# Patient Record
Sex: MALE | Race: WHITE | NOT HISPANIC OR LATINO
[De-identification: names, ages, dates, MRNs, and addresses within clinical notes are randomized per-mention and may not be internally consistent; named-entity substitution may affect disease eponyms.]

---
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
## 2022-03-29 ENCOUNTER — EMERGENCY (EMERGENCY)
Facility: HOSPITAL | Age: 22
LOS: 1 days | Discharge: HOME | End: 2022-03-29
Attending: EMERGENCY MEDICINE | Admitting: EMERGENCY MEDICINE
Payer: COMMERCIAL

## 2022-03-29 VITALS
DIASTOLIC BLOOD PRESSURE: 56 MMHG | SYSTOLIC BLOOD PRESSURE: 135 MMHG | OXYGEN SATURATION: 99 % | RESPIRATION RATE: 18 BRPM | HEART RATE: 92 BPM

## 2022-03-29 VITALS
HEIGHT: 65 IN | OXYGEN SATURATION: 95 % | RESPIRATION RATE: 24 BRPM | TEMPERATURE: 99 F | SYSTOLIC BLOOD PRESSURE: 142 MMHG | HEART RATE: 102 BPM | WEIGHT: 214.95 LBS | DIASTOLIC BLOOD PRESSURE: 70 MMHG

## 2022-03-29 PROBLEM — R56.9 UNSPECIFIED CONVULSIONS: Chronic | Status: ACTIVE | Noted: 2021-10-05

## 2022-03-29 PROBLEM — F84.0 AUTISTIC DISORDER: Chronic | Status: ACTIVE | Noted: 2021-10-05

## 2022-03-29 LAB
ALBUMIN SERPL ELPH-MCNC: 3.9 G/DL — SIGNIFICANT CHANGE UP (ref 3.3–5)
ALP SERPL-CCNC: 90 U/L — SIGNIFICANT CHANGE UP (ref 30–120)
ALT FLD-CCNC: 98 U/L DA — HIGH (ref 10–60)
ANION GAP SERPL CALC-SCNC: 4 MMOL/L — LOW (ref 5–17)
APTT BLD: 27.3 SEC — LOW (ref 27.5–35.5)
AST SERPL-CCNC: 61 U/L — HIGH (ref 10–40)
BASOPHILS # BLD AUTO: 0.01 K/UL — SIGNIFICANT CHANGE UP (ref 0–0.2)
BASOPHILS NFR BLD AUTO: 0.1 % — SIGNIFICANT CHANGE UP (ref 0–2)
BILIRUB SERPL-MCNC: 0.2 MG/DL — SIGNIFICANT CHANGE UP (ref 0.2–1.2)
BUN SERPL-MCNC: 16 MG/DL — SIGNIFICANT CHANGE UP (ref 7–23)
CALCIUM SERPL-MCNC: 9 MG/DL — SIGNIFICANT CHANGE UP (ref 8.4–10.5)
CHLORIDE SERPL-SCNC: 102 MMOL/L — SIGNIFICANT CHANGE UP (ref 96–108)
CO2 SERPL-SCNC: 30 MMOL/L — SIGNIFICANT CHANGE UP (ref 22–31)
CREAT SERPL-MCNC: 0.83 MG/DL — SIGNIFICANT CHANGE UP (ref 0.5–1.3)
EGFR: 129 ML/MIN/1.73M2 — SIGNIFICANT CHANGE UP
EOSINOPHIL # BLD AUTO: 0 K/UL — SIGNIFICANT CHANGE UP (ref 0–0.5)
EOSINOPHIL NFR BLD AUTO: 0 % — SIGNIFICANT CHANGE UP (ref 0–6)
GLUCOSE SERPL-MCNC: 95 MG/DL — SIGNIFICANT CHANGE UP (ref 70–99)
HCT VFR BLD CALC: 46.2 % — SIGNIFICANT CHANGE UP (ref 39–50)
HGB BLD-MCNC: 14.2 G/DL — SIGNIFICANT CHANGE UP (ref 13–17)
IMM GRANULOCYTES NFR BLD AUTO: 0.5 % — SIGNIFICANT CHANGE UP (ref 0–1.5)
INR BLD: 1.15 RATIO — SIGNIFICANT CHANGE UP (ref 0.88–1.16)
LYMPHOCYTES # BLD AUTO: 1.1 K/UL — SIGNIFICANT CHANGE UP (ref 1–3.3)
LYMPHOCYTES # BLD AUTO: 7.4 % — LOW (ref 13–44)
MCHC RBC-ENTMCNC: 25.2 PG — LOW (ref 27–34)
MCHC RBC-ENTMCNC: 30.7 GM/DL — LOW (ref 32–36)
MCV RBC AUTO: 81.9 FL — SIGNIFICANT CHANGE UP (ref 80–100)
MONOCYTES # BLD AUTO: 0.64 K/UL — SIGNIFICANT CHANGE UP (ref 0–0.9)
MONOCYTES NFR BLD AUTO: 4.3 % — SIGNIFICANT CHANGE UP (ref 2–14)
NEUTROPHILS # BLD AUTO: 13.12 K/UL — HIGH (ref 1.8–7.4)
NEUTROPHILS NFR BLD AUTO: 87.7 % — HIGH (ref 43–77)
NRBC # BLD: 0 /100 WBCS — SIGNIFICANT CHANGE UP (ref 0–0)
PLATELET # BLD AUTO: 337 K/UL — SIGNIFICANT CHANGE UP (ref 150–400)
POTASSIUM SERPL-MCNC: 5.5 MMOL/L — HIGH (ref 3.5–5.3)
POTASSIUM SERPL-SCNC: 5.5 MMOL/L — HIGH (ref 3.5–5.3)
PROT SERPL-MCNC: 8.4 G/DL — HIGH (ref 6–8.3)
PROTHROM AB SERPL-ACNC: 13.2 SEC — SIGNIFICANT CHANGE UP (ref 10.5–13.4)
RBC # BLD: 5.64 M/UL — SIGNIFICANT CHANGE UP (ref 4.2–5.8)
RBC # FLD: 14.9 % — HIGH (ref 10.3–14.5)
SARS-COV-2 RNA SPEC QL NAA+PROBE: SIGNIFICANT CHANGE UP
SODIUM SERPL-SCNC: 136 MMOL/L — SIGNIFICANT CHANGE UP (ref 135–145)
VALPROATE SERPL-MCNC: 53 UG/ML — SIGNIFICANT CHANGE UP (ref 50–100)
WBC # BLD: 14.95 K/UL — HIGH (ref 3.8–10.5)
WBC # FLD AUTO: 14.95 K/UL — HIGH (ref 3.8–10.5)

## 2022-03-29 PROCEDURE — 36415 COLL VENOUS BLD VENIPUNCTURE: CPT

## 2022-03-29 PROCEDURE — 70450 CT HEAD/BRAIN W/O DYE: CPT | Mod: 26,MA

## 2022-03-29 PROCEDURE — 70450 CT HEAD/BRAIN W/O DYE: CPT | Mod: MA

## 2022-03-29 PROCEDURE — 80164 ASSAY DIPROPYLACETIC ACD TOT: CPT

## 2022-03-29 PROCEDURE — 80053 COMPREHEN METABOLIC PANEL: CPT

## 2022-03-29 PROCEDURE — 99284 EMERGENCY DEPT VISIT MOD MDM: CPT | Mod: 25

## 2022-03-29 PROCEDURE — 85025 COMPLETE CBC W/AUTO DIFF WBC: CPT

## 2022-03-29 PROCEDURE — 96365 THER/PROPH/DIAG IV INF INIT: CPT

## 2022-03-29 PROCEDURE — 87635 SARS-COV-2 COVID-19 AMP PRB: CPT

## 2022-03-29 PROCEDURE — 99285 EMERGENCY DEPT VISIT HI MDM: CPT

## 2022-03-29 PROCEDURE — 85610 PROTHROMBIN TIME: CPT

## 2022-03-29 PROCEDURE — 85730 THROMBOPLASTIN TIME PARTIAL: CPT

## 2022-03-29 RX ORDER — VALPROIC ACID (AS SODIUM SALT) 250 MG/5ML
500 SOLUTION, ORAL ORAL ONCE
Refills: 0 | Status: COMPLETED | OUTPATIENT
Start: 2022-03-29 | End: 2022-03-29

## 2022-03-29 RX ORDER — VALPROIC ACID (AS SODIUM SALT) 250 MG/5ML
750 SOLUTION, ORAL ORAL
Qty: 0 | Refills: 0 | DISCHARGE

## 2022-03-29 RX ORDER — CLOBAZAM 10 MG/1
6 TABLET ORAL
Qty: 0 | Refills: 0 | DISCHARGE

## 2022-03-29 RX ADMIN — Medication 500 MILLIGRAM(S): at 11:15

## 2022-03-29 RX ADMIN — Medication 55 MILLIGRAM(S): at 10:15

## 2022-03-29 NOTE — ED PROVIDER NOTE - NSFOLLOWUPCLINICS_GEN_ALL_ED_FT
[facility]  Neurology [facility]psy  [address] [place] [id number]  Phone: [phone]  Fax: [phone]  Follow Up Time: 1-3 Days

## 2022-03-29 NOTE — ED PROVIDER NOTE - NSFOLLOWUPINSTRUCTIONS_ED_ALL_ED_FT
Seizure, Adult      A seizure is a sudden burst of abnormal electrical and chemical activity in the brain. Seizures usually last from 30 seconds to 2 minutes.       What are the causes?    Common causes of this condition include:  •Fever or infection.    •Problems that affect the brain. These may include:  •A brain or head injury.      •Bleeding in the brain.      •A brain tumor.        •Low levels of blood sugar or salt.      •Kidney problems or liver problems.      •Conditions that are passed from parent to child (are inherited).    •Problems with a substance, such as:  •Having a reaction to a drug or a medicine.      •Stopping the use of a substance all of a sudden (withdrawal).        •A stroke.      •Disorders that affect how you develop.      Sometimes, the cause may not be known.       What increases the risk?    •Having someone in your family who has epilepsy. In this condition, seizures happen again and again over time. They have no clear cause.    •Having had a tonic–clonic seizure before. This type of seizure causes you to:  •Tighten the muscles of the whole body.      •Lose consciousness.        •Having had a head injury or strokes before.      •Having had a lack of oxygen at birth.        What are the signs or symptoms?    There are many types of seizures. The symptoms vary depending on the type of seizure you have.    Symptoms during a seizure     •Shaking that you cannot control (convulsions) with fast, jerky movements of muscles.      •Stiffness of the body.      •Breathing problems.      •Feeling mixed up (confused).      •Staring or not responding to sound or touch.      •Head nodding.      •Eyes that blink, flutter, or move fast.      •Drooling, grunting, or making clicking sounds with your mouth      •Losing control of when you pee or poop.      Symptoms before a seizure     •Feeling afraid, nervous, or worried.      •Feeling like you may vomit.    •Feeling like:  •You are moving when you are not.      •Things around you are moving when they are not.        •Feeling like you saw or heard something before (déjà vu).      •Odd tastes or smells.      •Changes in how you see. You may see flashing lights or spots.      Symptoms after a seizure     •Feeling confused.      •Feeling sleepy.      •Headache.       •Sore muscles.        How is this treated?    If your seizure stops on its own, you will not need treatment. If your seizure lasts longer than 5 minutes, you will normally need treatment. Treatment may include:  •Medicines given through an IV tube.      •Avoiding things, such as medicines, that are known to cause your seizures.      •Medicines to prevent seizures.      •A device to prevent or control seizures.      •Surgery.      •A diet low in carbohydrates and high in fat (ketogenic diet).        Follow these instructions at home:    Medicines     •Take over-the-counter and prescription medicines only as told by your doctor.      •Avoid foods or drinks that may keep your medicine from working, such as alcohol.      Activity     •Follow instructions about driving, swimming, or doing things that would be dangerous if you had another seizure. Wait until your doctor says it is safe for you to do these things.      •If you live in the [place] ask your local department of [employer] when you can drive.      •Get a lot of rest.        Teaching others    •Teach friends and family what to do when you have a seizure. They should:  •Help you get down to the ground.      •Protect your head and body.      •Loosen any clothing around your neck.      •Turn you on your side.      •Know whether or not you need emergency care.      •Stay with you until you are better.      •Also, tell them what not to do if you have a seizure. Tell them:  •They should not hold you down.      •They should not put anything in your mouth.        General instructions     •Avoid anything that gives you seizures.    •Keep a seizure diary. Write down:  •What you remember about each seizure.      •What you think caused each seizure.        •Keep all follow-up visits.        Contact a doctor if:    •You have another seizure or seizures. Call the doctor each time you have a seizure.      •The pattern of your seizures changes.      •You keep having seizures with treatment.      •You have symptoms of being sick or having an infection.      •You are not able to take your medicine.        Get help right away if:  •You have any of these problems:  •A seizure that lasts longer than 5 minutes.      •Many seizures in a row and you do not feel better between seizures.      •A seizure that makes it harder to breathe.      •A seizure and you can no longer speak or use part of your body.        •You do not wake up right after a seizure.      •You get hurt during a seizure.      •You feel confused or have pain right after a seizure.      These symptoms may be an emergency. Get help right away. Call your local emergency services (911 in the [place]   • Do not wait to see if the symptoms will go away.       • Do not drive yourself to the hospital.         Summary    •A seizure is a sudden burst of abnormal electrical and chemical activity in the brain. Seizures normally last from 30 seconds to 2 minutes.      •Causes of seizures include illness, injury to the head, low levels of blood sugar or salt, and certain conditions.      •Most seizures will stop on their own in less than 5 minutes. Seizures that last longer than 5 minutes are a medical emergency and need treatment right away.      •Many medicines are used to treat seizures. Take over-the-counter and prescription medicines only as told by your doctor.      This information is not intended to replace advice given to you by your health care provider. Make sure you discuss any questions you have with your health care provider.

## 2022-03-29 NOTE — ED ADULT NURSE NOTE - OBJECTIVE STATEMENT
Brought in by EMS, from [facility], with multiple episodes of seizures this am witnessed by staff. Pt was given Versed 5 mg IV by EMS, came in to ED   post ictal, asleep. Respiration unlabored. O2 100%NRB in use. As per [facility] staff, pt is normally non verbal. Color pink, Skin warm and dry.  PERRL.

## 2022-03-29 NOTE — ED PROVIDER NOTE - ENMT, MLM
Airway patent, Nasal mucosa clear. Mouth with normal mucosa. Throat has no vesicles, no oropharyngeal exudates and uvula is midline.  Scalp at/nt.

## 2022-03-29 NOTE — CONSULT NOTE ADULT - ASSESSMENT
I have reviewed the H&P, I have examined the patient, and the changes to the patient's condition are as follows: heart has regular rate and rhythm.         Seen for seizure.  Pt has h/o seizures and is on Depakote 250 mg TID and Clonazepam   CT Head - No acute pathology.  Breakthrough seizure.  Depakote 500 mg IVX1 given to ED on my recommendation  D/w ED physician, Dr. Duarte.   Pt could be DC back to Group home, continue his anti seizure meds.  Rec Fall/seizure precautions at all times.   22 yo male with autism, seizures, BIBA for eval of seizure today.  Non verbal as per base line.  Pt has h/o seizures and is on Depakote 250 mg TID and Clonazepam   CT Head - No acute pathology.  Breakthrough seizure.  Depakote 500 mg IVX1 given to ED on my recommendation  Pt could be DC back to Group home, when at his base line, continue his anti seizure meds.  F/up with his neurologist.  Rec Fall/seizure precautions at all times.  D/w gropup home attendant at bedside.   D/w ED physician, Dr. Duarte.

## 2022-03-29 NOTE — ED PROVIDER NOTE - PATIENT PORTAL LINK FT
You can access the FollowMyHealth Patient Portal offered by [facility] by registering at the following website: [email or web address]. By joining [employer]’s FollowMyHealth portal, you will also be able to view your health information using other applications (apps) compatible with our system.

## 2022-03-29 NOTE — ED PROVIDER NOTE - OBJECTIVE STATEMENT
20 yo male with autism, seizures, BIBA for eval of seizure today. Was given Versed by EMS. Now post ictal. No further hx obtainable.  Pt is normally nonverbal according to group home staff.

## 2022-03-29 NOTE — CONSULT NOTE ADULT - SUBJECTIVE AND OBJECTIVE BOX
Patient is a 21y old  Male who presents with a chief complaint of seizure.    HPI:  22 yo male with autism, seizures, BIBA for eval of seizure today. Was given Versed by EMS. Now post ictal. No further hx obtainable.  Pt is normally nonverbal according to group home staff.      PAST MEDICAL & SURGICAL HISTORY:    Seizure    Autism    Home Medications:    clonazePAM 0.5 mg oral tablet, disintegratin tab(s) orally every 8 hours (29 Mar 2022 09:21)  clonazePAM 1 mg oral tablet:  (29 Mar 2022 09:21)  probiotic gummies: 2 tab(s)  once a day (29 Mar 2022 09:21)  valproic acid 250 mg/5 mL oral liquid: 11 milliliter(s) orally 3 times a day (29 Mar 2022 09:21)  Valtoco 20 mg Dose nasal spray:  for seizures lasting more than 5 minutes (29 Mar 2022 09:21)    Allergies    No Known Allergies    SOCIAL HISTORY:    No h/o Smoking.   No h/o alcohol use.    FAMILY HISTORY: N/C as per chart    REVIEW OF SYSTEMS: UTO    PHYSICAL EXAM:  Vital Signs Last 24 Hrs  T(F): 99.2 (22 @ 09:13)  HR: 102 (22 @ 09:13)  BP: 142/70 (22 @ 09:13)  RR: 24 (22 @ 09:13)    GENERAL: NAD, well-groomed, well-developed  HEAD:  Atraumatic, Normocephalic  EYES: EOMI, PERRLA, conjunctiva and sclera clear  NECK: Supple, No JVD, thyroid non-palpable    On Neurological Examination:    Mental Status - Pt is alert, awake, oriented X3. Higher functions are intact. Pt. does have mild poor cognition. Follows commands well and able to answer questions appropriately.    Speech -  Normal. Slurred. Pt has no aphasia.    Cranial Nerves - Pupils 3 mm equal and reactive to light, extraocular eye movements intact. Pt has no visual field deficit.  Pt has no right left facial asymmetry. Tongue - is in midline.    Motor Exam - 4 plus/5 all over, No drift. No shaking or tremors.  Muscle tone - is normal all over. Moves all extremities equally. No asymmetry is seen.      Sensory Exam - Pin prick, temperature, joint position and vibration are intact on either side. Pt withdraws all extremities equally on stimulation. No asymmetry seen. No complaints of tingling, numbness.    Gait - Able to stand and walk unassisted. Pt is able to stand up with holding my hands and is able to walk for few feet around the bed. Not falling to either side.    Deep tendon Reflexes - 2 plus all over.    Coordination - Fine finger movements are normal on both sides. Finger to nose is also normal on both sides.       Romberg - Negative.    Neck Supple -  Yes.    LABS:                        14.2   14.95 )-----------( 337      ( 29 Mar 2022 09:34 )             46.2         136  |  102  |  16  ----------------------------<  95  5.5<H>   |  30  |  0.83    Ca    9.0      29 Mar 2022 09:34    TPro  8.4<H>  /  Alb  3.9  /  TBili  0.2  /  DBili  x   /  AST  61<H>  /  ALT  98<H>  /  AlkPhos  90      PT/INR - ( 29 Mar 2022 09:34 )   PT: 13.2 sec;   INR: 1.15 ratio      Depakote level is 53    PTT - ( 29 Mar 2022 09:34 )  PTT:27.3 sec    RADIOLOGY & ADDITIONAL STUDIES:    < from: CT Head No Cont (22 @ 09:46) >  Impression:  Unremarkable noncontrast head CT.    < end of copied text >     Patient is a 21y old  Male who presents with a chief complaint of seizure.    HPI:  22 yo male with autism, seizures, BIBA for eval of seizure today. Was given Versed by EMS. Now post ictal. No further hx obtainable.  Pt is normally nonverbal according to group home staff.    PAST MEDICAL & SURGICAL HISTORY:    Seizure    Autism    Home Medications:    clonazePAM 0.5 mg oral tablet, disintegratin tab(s) orally every 8 hours (29 Mar 2022 09:21)  clonazePAM 1 mg oral tablet:  (29 Mar 2022 09:21)  probiotic gummies: 2 tab(s)  once a day (29 Mar 2022 09:21)  valproic acid 250 mg/5 mL oral liquid: 11 milliliter(s) orally 3 times a day (29 Mar 2022 09:21)  Valtoco 20 mg Dose nasal spray:  for seizures lasting more than 5 minutes (29 Mar 2022 09:21)    Allergies    No Known Allergies    SOCIAL HISTORY:    No h/o Smoking.   No h/o alcohol use.    FAMILY HISTORY: N/C as per chart    REVIEW OF SYSTEMS: UTO    PHYSICAL EXAM:  Vital Signs Last 24 Hrs  T(F): 99.2 (22 @ 09:13)  HR: 102 (22 @ 09:13)  BP: 142/70 (22 @ 09:13)  RR: 24 (22 @ 09:13)    GENERAL: NAD, well-groomed, well-developed  HEAD:  Atraumatic, Normocephalic  EYES: EOMI, PERRLA, conjunctiva and sclera clear  NECK: Supple, No JVD    On Neurological Examination:    Mental Status - Pt is Asleep. Also received Versed earlier.    Speech -  Non verbal    Cranial Nerves - Pupils 3.5 mm equal and reactive to light. No facial asymmetry.     Motor Exam - Moves all extremities equally on stimulation.     Sensory Exam - Pt withdraws all extremities equally on stimulation.     Gait - Couldn't be tested currently.    Deep tendon Reflexes - 2 plus all over.    Neck Supple -  Yes.    LABS:                        14.2   14.95 )-----------( 337      ( 29 Mar 2022 09:34 )             46.2         136  |  102  |  16  ----------------------------<  95  5.5<H>   |  30  |  0.83    Ca    9.0      29 Mar 2022 09:34    TPro  8.4<H>  /  Alb  3.9  /  TBili  0.2  /  DBili  x   /  AST  61<H>  /  ALT  98<H>  /  AlkPhos  90      PT/INR - ( 29 Mar 2022 09:34 )   PT: 13.2 sec;   INR: 1.15 ratio      Depakote level is 53    PTT - ( 29 Mar 2022 09:34 )  PTT:27.3 sec    RADIOLOGY & ADDITIONAL STUDIES:    < from: CT Head No Cont (22 @ 09:46) >  Impression:  Unremarkable noncontrast head CT.    < end of copied text >

## 2022-03-29 NOTE — ED PROVIDER NOTE - PROGRESS NOTE DETAILS
More awake. Eating sandwich. Plan - DC home with neuro FU tomorrow as discussed with staff and pts father.

## 2022-03-29 NOTE — ED PROVIDER NOTE - CLINICAL SUMMARY MEDICAL DECISION MAKING FREE TEXT BOX
Autistic group home resident with break through seizure today. Plan - Labs, CT, Depakote level. May need neuro eval.

## 2023-01-15 ENCOUNTER — INPATIENT (INPATIENT)
Facility: HOSPITAL | Age: 23
LOS: 0 days | Discharge: HOME | DRG: 101 | End: 2023-01-16
Attending: HOSPITALIST | Admitting: HOSPITALIST
Payer: COMMERCIAL

## 2023-01-15 VITALS
SYSTOLIC BLOOD PRESSURE: 125 MMHG | DIASTOLIC BLOOD PRESSURE: 76 MMHG | HEIGHT: 69 IN | HEART RATE: 66 BPM | RESPIRATION RATE: 20 BRPM | TEMPERATURE: 98 F | WEIGHT: 210.1 LBS | OXYGEN SATURATION: 100 %

## 2023-01-15 DIAGNOSIS — R56.9 UNSPECIFIED CONVULSIONS: ICD-10-CM

## 2023-01-15 LAB
ALBUMIN SERPL ELPH-MCNC: 3.5 G/DL — SIGNIFICANT CHANGE UP (ref 3.3–5)
ALBUMIN SERPL ELPH-MCNC: 3.5 G/DL — SIGNIFICANT CHANGE UP (ref 3.3–5)
ALP SERPL-CCNC: 68 U/L — SIGNIFICANT CHANGE UP (ref 30–120)
ALP SERPL-CCNC: 72 U/L — SIGNIFICANT CHANGE UP (ref 30–120)
ALT FLD-CCNC: 65 U/L DA — HIGH (ref 10–60)
ALT FLD-CCNC: 79 U/L DA — HIGH (ref 10–60)
ANION GAP SERPL CALC-SCNC: 12 MMOL/L — SIGNIFICANT CHANGE UP (ref 5–17)
ANION GAP SERPL CALC-SCNC: 9 MMOL/L — SIGNIFICANT CHANGE UP (ref 5–17)
AST SERPL-CCNC: 39 U/L — SIGNIFICANT CHANGE UP (ref 10–40)
AST SERPL-CCNC: 52 U/L — HIGH (ref 10–40)
BASOPHILS # BLD AUTO: 0.03 K/UL — SIGNIFICANT CHANGE UP (ref 0–0.2)
BASOPHILS # BLD AUTO: 0.03 K/UL — SIGNIFICANT CHANGE UP (ref 0–0.2)
BASOPHILS NFR BLD AUTO: 0.3 % — SIGNIFICANT CHANGE UP (ref 0–2)
BASOPHILS NFR BLD AUTO: 0.4 % — SIGNIFICANT CHANGE UP (ref 0–2)
BILIRUB SERPL-MCNC: 0.3 MG/DL — SIGNIFICANT CHANGE UP (ref 0.2–1.2)
BILIRUB SERPL-MCNC: 0.3 MG/DL — SIGNIFICANT CHANGE UP (ref 0.2–1.2)
BUN SERPL-MCNC: 16 MG/DL — SIGNIFICANT CHANGE UP (ref 7–23)
BUN SERPL-MCNC: 16 MG/DL — SIGNIFICANT CHANGE UP (ref 7–23)
CALCIUM SERPL-MCNC: 9.2 MG/DL — SIGNIFICANT CHANGE UP (ref 8.4–10.5)
CALCIUM SERPL-MCNC: 9.5 MG/DL — SIGNIFICANT CHANGE UP (ref 8.4–10.5)
CHLORIDE SERPL-SCNC: 103 MMOL/L — SIGNIFICANT CHANGE UP (ref 96–108)
CHLORIDE SERPL-SCNC: 103 MMOL/L — SIGNIFICANT CHANGE UP (ref 96–108)
CO2 SERPL-SCNC: 25 MMOL/L — SIGNIFICANT CHANGE UP (ref 22–31)
CO2 SERPL-SCNC: 27 MMOL/L — SIGNIFICANT CHANGE UP (ref 22–31)
CREAT SERPL-MCNC: 0.75 MG/DL — SIGNIFICANT CHANGE UP (ref 0.5–1.3)
CREAT SERPL-MCNC: 0.78 MG/DL — SIGNIFICANT CHANGE UP (ref 0.5–1.3)
EGFR: 131 ML/MIN/1.73M2 — SIGNIFICANT CHANGE UP
EGFR: 132 ML/MIN/1.73M2 — SIGNIFICANT CHANGE UP
EOSINOPHIL # BLD AUTO: 0.12 K/UL — SIGNIFICANT CHANGE UP (ref 0–0.5)
EOSINOPHIL # BLD AUTO: 0.15 K/UL — SIGNIFICANT CHANGE UP (ref 0–0.5)
EOSINOPHIL NFR BLD AUTO: 1.4 % — SIGNIFICANT CHANGE UP (ref 0–6)
EOSINOPHIL NFR BLD AUTO: 1.7 % — SIGNIFICANT CHANGE UP (ref 0–6)
GLUCOSE SERPL-MCNC: 88 MG/DL — SIGNIFICANT CHANGE UP (ref 70–99)
GLUCOSE SERPL-MCNC: 93 MG/DL — SIGNIFICANT CHANGE UP (ref 70–99)
HCT VFR BLD CALC: 41.9 % — SIGNIFICANT CHANGE UP (ref 39–50)
HCT VFR BLD CALC: 44.8 % — SIGNIFICANT CHANGE UP (ref 39–50)
HGB BLD-MCNC: 12.9 G/DL — LOW (ref 13–17)
HGB BLD-MCNC: 13.7 G/DL — SIGNIFICANT CHANGE UP (ref 13–17)
IMM GRANULOCYTES NFR BLD AUTO: 0.3 % — SIGNIFICANT CHANGE UP (ref 0–0.9)
IMM GRANULOCYTES NFR BLD AUTO: 0.4 % — SIGNIFICANT CHANGE UP (ref 0–0.9)
LYMPHOCYTES # BLD AUTO: 2.46 K/UL — SIGNIFICANT CHANGE UP (ref 1–3.3)
LYMPHOCYTES # BLD AUTO: 29.5 % — SIGNIFICANT CHANGE UP (ref 13–44)
LYMPHOCYTES # BLD AUTO: 3.71 K/UL — HIGH (ref 1–3.3)
LYMPHOCYTES # BLD AUTO: 43.1 % — SIGNIFICANT CHANGE UP (ref 13–44)
MAGNESIUM SERPL-MCNC: 1.9 MG/DL — SIGNIFICANT CHANGE UP (ref 1.6–2.6)
MAGNESIUM SERPL-MCNC: 4 MG/DL — HIGH (ref 1.6–2.6)
MCHC RBC-ENTMCNC: 24.7 PG — LOW (ref 27–34)
MCHC RBC-ENTMCNC: 24.8 PG — LOW (ref 27–34)
MCHC RBC-ENTMCNC: 30.6 GM/DL — LOW (ref 32–36)
MCHC RBC-ENTMCNC: 30.8 GM/DL — LOW (ref 32–36)
MCV RBC AUTO: 80.4 FL — SIGNIFICANT CHANGE UP (ref 80–100)
MCV RBC AUTO: 80.9 FL — SIGNIFICANT CHANGE UP (ref 80–100)
MONOCYTES # BLD AUTO: 0.6 K/UL — SIGNIFICANT CHANGE UP (ref 0–0.9)
MONOCYTES # BLD AUTO: 0.66 K/UL — SIGNIFICANT CHANGE UP (ref 0–0.9)
MONOCYTES NFR BLD AUTO: 7.2 % — SIGNIFICANT CHANGE UP (ref 2–14)
MONOCYTES NFR BLD AUTO: 7.7 % — SIGNIFICANT CHANGE UP (ref 2–14)
NEUTROPHILS # BLD AUTO: 4.03 K/UL — SIGNIFICANT CHANGE UP (ref 1.8–7.4)
NEUTROPHILS # BLD AUTO: 5.09 K/UL — SIGNIFICANT CHANGE UP (ref 1.8–7.4)
NEUTROPHILS NFR BLD AUTO: 46.9 % — SIGNIFICANT CHANGE UP (ref 43–77)
NEUTROPHILS NFR BLD AUTO: 61.1 % — SIGNIFICANT CHANGE UP (ref 43–77)
NRBC # BLD: 0 /100 WBCS — SIGNIFICANT CHANGE UP (ref 0–0)
NRBC # BLD: 0 /100 WBCS — SIGNIFICANT CHANGE UP (ref 0–0)
PHOSPHATE SERPL-MCNC: 3.8 MG/DL — SIGNIFICANT CHANGE UP (ref 2.5–4.5)
PLATELET # BLD AUTO: 321 K/UL — SIGNIFICANT CHANGE UP (ref 150–400)
PLATELET # BLD AUTO: 342 K/UL — SIGNIFICANT CHANGE UP (ref 150–400)
POTASSIUM SERPL-MCNC: 3.8 MMOL/L — SIGNIFICANT CHANGE UP (ref 3.5–5.3)
POTASSIUM SERPL-MCNC: 4 MMOL/L — SIGNIFICANT CHANGE UP (ref 3.5–5.3)
POTASSIUM SERPL-SCNC: 3.8 MMOL/L — SIGNIFICANT CHANGE UP (ref 3.5–5.3)
POTASSIUM SERPL-SCNC: 4 MMOL/L — SIGNIFICANT CHANGE UP (ref 3.5–5.3)
PROT SERPL-MCNC: 8 G/DL — SIGNIFICANT CHANGE UP (ref 6–8.3)
PROT SERPL-MCNC: 8.1 G/DL — SIGNIFICANT CHANGE UP (ref 6–8.3)
RBC # BLD: 5.21 M/UL — SIGNIFICANT CHANGE UP (ref 4.2–5.8)
RBC # BLD: 5.54 M/UL — SIGNIFICANT CHANGE UP (ref 4.2–5.8)
RBC # FLD: 15.6 % — HIGH (ref 10.3–14.5)
RBC # FLD: 15.7 % — HIGH (ref 10.3–14.5)
SARS-COV-2 RNA SPEC QL NAA+PROBE: SIGNIFICANT CHANGE UP
SODIUM SERPL-SCNC: 139 MMOL/L — SIGNIFICANT CHANGE UP (ref 135–145)
SODIUM SERPL-SCNC: 140 MMOL/L — SIGNIFICANT CHANGE UP (ref 135–145)
TSH SERPL-MCNC: 1.9 UIU/ML — SIGNIFICANT CHANGE UP (ref 0.27–4.2)
VALPROATE SERPL-MCNC: 70 UG/ML — SIGNIFICANT CHANGE UP (ref 50–100)
WBC # BLD: 8.33 K/UL — SIGNIFICANT CHANGE UP (ref 3.8–10.5)
WBC # BLD: 8.61 K/UL — SIGNIFICANT CHANGE UP (ref 3.8–10.5)
WBC # FLD AUTO: 8.33 K/UL — SIGNIFICANT CHANGE UP (ref 3.8–10.5)
WBC # FLD AUTO: 8.61 K/UL — SIGNIFICANT CHANGE UP (ref 3.8–10.5)

## 2023-01-15 PROCEDURE — 99222 1ST HOSP IP/OBS MODERATE 55: CPT

## 2023-01-15 PROCEDURE — 99285 EMERGENCY DEPT VISIT HI MDM: CPT

## 2023-01-15 PROCEDURE — 70450 CT HEAD/BRAIN W/O DYE: CPT | Mod: 26

## 2023-01-15 PROCEDURE — 12345: CPT | Mod: NC

## 2023-01-15 PROCEDURE — 99053 MED SERV 10PM-8AM 24 HR FAC: CPT

## 2023-01-15 RX ORDER — SODIUM CHLORIDE 9 MG/ML
1000 INJECTION INTRAMUSCULAR; INTRAVENOUS; SUBCUTANEOUS ONCE
Refills: 0 | Status: COMPLETED | OUTPATIENT
Start: 2023-01-15 | End: 2023-01-15

## 2023-01-15 RX ORDER — VALPROIC ACID (AS SODIUM SALT) 250 MG/5ML
500 SOLUTION, ORAL ORAL ONCE
Refills: 0 | Status: COMPLETED | OUTPATIENT
Start: 2023-01-15 | End: 2023-01-15

## 2023-01-15 RX ORDER — LANOLIN ALCOHOL/MO/W.PET/CERES
3 CREAM (GRAM) TOPICAL AT BEDTIME
Refills: 0 | Status: DISCONTINUED | OUTPATIENT
Start: 2023-01-15 | End: 2023-01-16

## 2023-01-15 RX ORDER — CLONAZEPAM 1 MG
0.5 TABLET ORAL THREE TIMES A DAY
Refills: 0 | Status: DISCONTINUED | OUTPATIENT
Start: 2023-01-15 | End: 2023-01-16

## 2023-01-15 RX ORDER — CLONAZEPAM 1 MG
0 TABLET ORAL
Qty: 0 | Refills: 0 | DISCHARGE

## 2023-01-15 RX ORDER — CHOLECALCIFEROL (VITAMIN D3) 125 MCG
1000 CAPSULE ORAL DAILY
Refills: 0 | Status: DISCONTINUED | OUTPATIENT
Start: 2023-01-15 | End: 2023-01-16

## 2023-01-15 RX ORDER — DIAZEPAM 5 MG
0 TABLET ORAL
Qty: 0 | Refills: 0 | DISCHARGE

## 2023-01-15 RX ORDER — CLONAZEPAM 1 MG
1 TABLET ORAL
Qty: 0 | Refills: 0 | DISCHARGE

## 2023-01-15 RX ORDER — VALPROIC ACID (AS SODIUM SALT) 250 MG/5ML
600 SOLUTION, ORAL ORAL THREE TIMES A DAY
Refills: 0 | Status: DISCONTINUED | OUTPATIENT
Start: 2023-01-15 | End: 2023-01-16

## 2023-01-15 RX ORDER — LEVETIRACETAM 250 MG/1
500 TABLET, FILM COATED ORAL ONCE
Refills: 0 | Status: COMPLETED | OUTPATIENT
Start: 2023-01-15 | End: 2023-01-15

## 2023-01-15 RX ORDER — ONDANSETRON 8 MG/1
4 TABLET, FILM COATED ORAL EVERY 8 HOURS
Refills: 0 | Status: DISCONTINUED | OUTPATIENT
Start: 2023-01-15 | End: 2023-01-16

## 2023-01-15 RX ORDER — ACETAMINOPHEN 500 MG
650 TABLET ORAL EVERY 6 HOURS
Refills: 0 | Status: DISCONTINUED | OUTPATIENT
Start: 2023-01-15 | End: 2023-01-16

## 2023-01-15 RX ADMIN — LEVETIRACETAM 400 MILLIGRAM(S): 250 TABLET, FILM COATED ORAL at 05:13

## 2023-01-15 RX ADMIN — Medication 1 MILLIGRAM(S): at 04:27

## 2023-01-15 RX ADMIN — Medication 1000 UNIT(S): at 13:19

## 2023-01-15 RX ADMIN — SODIUM CHLORIDE 1000 MILLILITER(S): 9 INJECTION INTRAMUSCULAR; INTRAVENOUS; SUBCUTANEOUS at 05:08

## 2023-01-15 RX ADMIN — LEVETIRACETAM 500 MILLIGRAM(S): 250 TABLET, FILM COATED ORAL at 05:30

## 2023-01-15 RX ADMIN — Medication 0.5 MILLIGRAM(S): at 21:06

## 2023-01-15 RX ADMIN — Medication 1 MILLIGRAM(S): at 04:47

## 2023-01-15 RX ADMIN — Medication 600 MILLIGRAM(S): at 21:07

## 2023-01-15 RX ADMIN — Medication 600 MILLIGRAM(S): at 13:17

## 2023-01-15 RX ADMIN — SODIUM CHLORIDE 2000 MILLILITER(S): 9 INJECTION INTRAMUSCULAR; INTRAVENOUS; SUBCUTANEOUS at 04:08

## 2023-01-15 RX ADMIN — Medication 0.5 MILLIGRAM(S): at 13:18

## 2023-01-15 RX ADMIN — Medication 55 MILLIGRAM(S): at 04:47

## 2023-01-15 NOTE — ED ADULT NURSE NOTE - OBJECTIVE STATEMENT
Pt BIBA from [facility] for seizure, per aid, pt has been having multiple brief seizures that are unusual for him, pt takes depakote and intranasal valium as needed. Pt BIBA from [facility] for seizure, per aid, pt has been having multiple brief seizures that are unusual for him, where pt's eye rolled back and is not moving. pt takes depakote and intranasal valium as needed. pt has a 17s of his usual seizure activity and was found to be postictal as per ems

## 2023-01-15 NOTE — CHART NOTE - NSCHARTNOTEFT_GEN_A_CORE
Patient seen and examined in TR A bed (in ED).  As per previous records, patient non-verbal at baseline.  No obvious seizure activity.  ED and tele-neurology notes reviewed.    PHYSICAL EXAM:  Vital Signs Last 24 Hrs  T(C): 36.9 (15 Dread 2023 08:00), Max: 36.9 (15 Dread 2023 08:00)  T(F): 98.4 (15 Dread 2023 08:00), Max: 98.4 (15 Dread 2023 08:00)  HR: 67 (15 Dread 2023 08:00) (66 - 67)  BP: 118/74 (15 Dread 2023 08:00) (118/74 - 125/76)  BP(mean): --  RR: 18 (15 Dread 2023 08:00) (18 - 20)  SpO2: 98% (15 Dread 2023 08:00) (98% - 100%)    Parameters below as of 15 Dread 2023 08:00  Patient On (Oxygen Delivery Method): room air    GENERAL: NAD, drowsy, gets up and looks around, then goes back to sleep  HEAD:  Atraumatic, Normocephalic  EYES: PERRLA, conjunctiva and sclera clear  NECK: Supple, No JVD, No Cervical LAD  NERVOUS SYSTEM:  limited due to inability to follow commands; Moving all 4 extremities against gravity; no facial droop  CHEST/LUNG:  No rales, rhonchi, wheezing, or rubs  HEART: Regular rate and rhythm; No murmurs, rubs, or gallops  ABDOMEN: Soft, Nontender, Nondistended, no palpable masses or organomegaly, no bruits  EXTREMITIES:  2+ Peripheral Pulses, No clubbing, cyanosis, or edema                          13.7   8.61  )-----------( 321      ( 15 Dread 2023 04:07 )             44.8     01-15    140  |  103  |  16  ----------------------------<  93  4.0   |  25  |  0.78    Ca    9.2      15 Dread 2023 04:07  Mg     4.0     01-15    TPro  8.1  /  Alb  3.5  /  TBili  0.3  /  DBili  x   /  AST  39  /  ALT  65<H>  /  AlkPhos  68  01-15    < from: CT Head No Cont (01.15.23 @ 05:54) >    IMPRESSION:  1. No acute intracranial CT findings.  2. New paranasal sinus disease, most prominent in the left frontal sinus,   correlate for acute sinusitis.    < end of copied text >    A/P:    Seizures - unclear as to why patient is having so many breakthrough seizures now.  No signs or indications of infections.    - admitted to telemetry  - tele-neuro recs noted  - formal neuro consult on Monday -> may need transfer for EEG/further neurological workup and interventions  - for now, continue with keppra 500mg IV BID  - continue withe valproic acid 600mg PO TID  - continue with clonazepam 0.5mg TID standing   - ativan IV PRN seizure activity  - seizure precautions, aspiration precautions    Preventive measures  - would hold off AC 2/2 seizure like activity and risk of self-injury  - SCD only Patient seen and examined in TR A bed (in ED).  As per previous records, patient non-verbal at baseline.  No obvious seizure activity.  ED and tele-neurology notes reviewed.    PHYSICAL EXAM:  Vital Signs Last 24 Hrs  T(C): 36.9 (15 Dread 2023 08:00), Max: 36.9 (15 Dread 2023 08:00)  T(F): 98.4 (15 Dread 2023 08:00), Max: 98.4 (15 Dread 2023 08:00)  HR: 67 (15 Dread 2023 08:00) (66 - 67)  BP: 118/74 (15 Dread 2023 08:00) (118/74 - 125/76)  BP(mean): --  RR: 18 (15 Dread 2023 08:00) (18 - 20)  SpO2: 98% (15 Dread 2023 08:00) (98% - 100%)    Parameters below as of 15 Dread 2023 08:00  Patient On (Oxygen Delivery Method): room air    GENERAL: NAD, drowsy, gets up and looks around, then goes back to sleep  HEAD:  Atraumatic, Normocephalic  EYES: PERRLA, conjunctiva and sclera clear  NECK: Supple, No JVD, No Cervical LAD  NERVOUS SYSTEM:  limited due to inability to follow commands; Moving all 4 extremities against gravity; no facial droop  CHEST/LUNG:  No rales, rhonchi, wheezing, or rubs  HEART: Regular rate and rhythm; No murmurs, rubs, or gallops  ABDOMEN: Soft, Nontender, Nondistended, no palpable masses or organomegaly, no bruits  EXTREMITIES:  2+ Peripheral Pulses, No clubbing, cyanosis, or edema                          13.7   8.61  )-----------( 321      ( 15 Dread 2023 04:07 )             44.8     01-15    140  |  103  |  16  ----------------------------<  93  4.0   |  25  |  0.78    Ca    9.2      15 Dread 2023 04:07  Mg     4.0     01-15    TPro  8.1  /  Alb  3.5  /  TBili  0.3  /  DBili  x   /  AST  39  /  ALT  65<H>  /  AlkPhos  68  01-15    < from: CT Head No Cont (01.15.23 @ 05:54) >    IMPRESSION:  1. No acute intracranial CT findings.  2. New paranasal sinus disease, most prominent in the left frontal sinus,   correlate for acute sinusitis.    < end of copied text >    A/P:    Seizures - unclear as to why patient is having so many breakthrough seizures now.  No signs or indications of infections.    - admitted to telemetry  - tele-neuro recs noted  - formal neuro consult on Monday (already called) -> may need transfer for EEG/further neurological workup and interventions  - for now, continue with keppra 500mg IV BID  - continue withe valproic acid 600mg PO TID  - continue with clonazepam 0.5mg TID standing   - ativan IV PRN seizure activity  - seizure precautions, aspiration precautions    Preventive measures  - would hold off AC 2/2 seizure like activity and risk of self-injury  - SCD only

## 2023-01-15 NOTE — ED PROVIDER NOTE - CLINICAL SUMMARY MEDICAL DECISION MAKING FREE TEXT BOX
Patient with seizure history had one grand mal seizure and multiple smaller episodes concerning for seizures. Will check labs including valproic acid level. If level not high, would give extra dose of valproic acid IV

## 2023-01-15 NOTE — CHART NOTE - NSCHARTNOTEFT_GEN_A_CORE
[facility]                                        Neurology at [place]                                 [facility]                                  [address]                                        [place], [place], [address]                                             [phone]          Case discussed with Dr Gee.    Patient with known seizure disorder on Valproic acid liquid 600 mg three times per day now with seizure. Has been having multiple brief seizures over the day yesterday.  The Valproic acid level is 70  Suggest loading Keppra and maintaining 500 twice per day for now.     Call formal neurology consult Monday/Tuesday.   My service will be available if further questions arise.

## 2023-01-15 NOTE — PATIENT PROFILE ADULT - FALL HARM RISK - HARM RISK INTERVENTIONS
Assistance OOB with selected safe patient handling equipment/Communicate Risk of Fall with Harm to all staff/Monitor for mental status changes/Move patient closer to nurses' station/Reinforce activity limits and safety measures with patient and family/Reorient to person, place and time as needed/Tailored Fall Risk Interventions/Toileting schedule using arm’s reach rule for commode and bathroom/Use of alarms - bed, chair and/or voice tab/Visual Cue: Yellow wristband and red socks/Bed in lowest position, wheels locked, appropriate side rails in place/Call bell, personal items and telephone in reach/Instruct patient to call for assistance before getting out of bed or chair/Non-slip footwear when patient is out of bed/[place] to call system/Physically safe environment - no spills, clutter or unnecessary equipment/Purposeful Proactive Rounding/Room/bathroom lighting operational, light cord in reach

## 2023-01-15 NOTE — ED ADULT NURSE NOTE - NSIMPLEMENTINTERV_GEN_ALL_ED
Implemented All Fall Risk Interventions:  [place] to call system. Call bell, personal items and telephone within reach. Instruct patient to call for assistance. Room bathroom lighting operational. Non-slip footwear when patient is off stretcher. Physically safe environment: no spills, clutter or unnecessary equipment. Stretcher in lowest position, wheels locked, appropriate side rails in place. Provide visual cue, wrist band, yellow gown, etc. Monitor gait and stability. Monitor for mental status changes and reorient to person, place, and time. Review medications for side effects contributing to fall risk. Reinforce activity limits and safety measures with patient and family.

## 2023-01-15 NOTE — ED ADULT TRIAGE NOTE - PRO INTERPRETER NEED 2
Naheed Cottrell is a pleasant 88F with renvascular HTN, HFpEF (EF 60), renal artery stenosis, PAD, carotid stenosis, h/o CVA who presents for evaluation of syncope x 2. The patient states she didn't drink much today besides 2 cups of coffee and a lemonade with her AM BP medications. She has been told to drink more water repeatedly. Today after supper she was standing when she felt odd, grabbed onto her daughter, and was eased down onto a chair. She did not fall or hit her head. She was out for a few seconds, came to at baseline mentation. They tried to stand her up from the chair a few minutes later and she again had LOC and returned to baseline a few seconds later. She was never confused, no slurred speech, convulsions, unilateral weakness, facial droop, B/B incontinence. She had otherwise been in her usual state of health - no URI symptoms, dysuria, NVD, melena, BRBPR, CP, SOB, fever. She had an episode of syncope several years ago associated with dehydration in the summer and another time with sepsis. She ambulates independently. EMS was activated and upon their arrival BP was noted to be very low, given 200cc NS bolus,     ED: Intake BP 90/40, improved to 140s/60s with 1L IVFs, orthostatics +, afebrile, no leukocytosis, Hgb stable, slight increase in SCr to 1.3, Tn negative, LA 1.6, EKG with PVCs x 2, CXR negative.   English

## 2023-01-15 NOTE — H&P ADULT - HISTORY OF PRESENT ILLNESS
22M with autism (non-verbal) and seizures who presents with seizures.  As per aide, patient has been having grand mal seizures.  Patient noted to have a grand mal seizure in mid-December and another grand-mal seizure 2 weeks ago.  Patient then started having small seizures with eyes rolling back/twitching and possibly extremity twitching on Friday.  Patient then started to have more seizures during the day on Saturday, requiring a rescue dose of Valtoco.  Then the aide starting documentating his seizures starting at 11pm.  Patient had 7 episodes of multiple small seizures yesterday, lasting from 3-13 seconds culminating in a 17 second grand mal seizure.  Patient still continued to have multiple seizures and required another dose of Valtoco.  Patient was eventually sent here for further evaluation.  Per aide, aside from the seizure, he has not been showing any signs or symptoms of any sickness.  No known fevers, cough, SOB, diarrhea.  Did not exhibit any discomfort or pain.  As per aide, no changes in medications recently.  In the ED, patient continued to have multiple small seizures.  Was given ativan 1mg IV x2 and valproic acid 500mg IV.  Labs were mostly unremarkable except for hypermagnesemia at 4.  HCT pending.  Valproic acid was 70.  COVID neg.  Tele-neurology was consulted by the ED and was suggested to load the patient with keppra and maintain keppra 500mg IV BID for now.  Patient is admitted to uncontrolled seizures.

## 2023-01-15 NOTE — ED ADULT NURSE NOTE - CHIEF COMPLAINT QUOTE
Sent from [facility] for witnessed "shaking" seizure for deya 17 seconds. Known seizure disorder. Facility gave Valtoco 10mg each nostril for a total of 20mg. Pt non-verbal, hx autism.

## 2023-01-15 NOTE — H&P ADULT - NSHPPHYSICALEXAM_GEN_ALL_CORE
PHYSICAL EXAM:  Vital Signs Last 24 Hrs  T(C): 36.4 (15 Dread 2023 03:42), Max: 36.4 (15 Dread 2023 03:42)  T(F): 97.5 (15 Dread 2023 03:42), Max: 97.5 (15 Dread 2023 03:42)  HR: 66 (15 Dread 2023 03:42) (66 - 66)  BP: 125/76 (15 Dread 2023 03:42) (125/76 - 125/76)  BP(mean): --  RR: 20 (15 Dread 2023 03:42) (20 - 20)  SpO2: 100% (15 Dread 2023 03:42) (100% - 100%)    Parameters below as of 15 Dread 2023 03:42  Patient On (Oxygen Delivery Method): room air    GENERAL:     age-appropriate male in NAD  HEAD:     atraumatic, normocephalic  EYES:     EOMI, conjunctiva and sclera clear  RESPIRATORY:     clear to auscultation bilaterally, no rales or rhonchi or wheezing or rubs  CARDIOVASCULAR:     regular rate and rhythm, no murmurs or rubs or gallops  GASTROINTESTINAL:     soft, nontender, nondistended, bowel sounds present  EXTREMITIES:     no clubbing or cyanosis or edema  MUSCULOSKELETAL:     no joint pain or swelling or deformities  NERVOUS SYSTEM:     moves all 4s  PSYCH:     non-verbal, sometimes would tract with eyes

## 2023-01-15 NOTE — H&P ADULT - NSHPLABSRESULTS_GEN_ALL_CORE
LABS:                        13.7   8.61  )-----------( 321      ( 15 Dread 2023 04:07 )             44.8     140    |  103    |  16     ----------------------------<  93       15 Dread 2023 04:07  4.0     |  25     |  0.78     Ca 9.2           15 Dread 2023 04:07    Mg 4.0<H>     15 Dread 2023 04:07    TPro  8.1    /  Alb  3.5    /  TBili  0.3    /  DBili  x      /  AST  39     /  ALT  65<H>  /  AlkPhos  68     15 Dread 2023 04:07      EKG:  none  Radiology:

## 2023-01-15 NOTE — ED PROVIDER NOTE - PROGRESS NOTE DETAILS
Patient continues to have multiple brief episodes of seizure like activity with full recovery between them. D/W Dr. Gordillo of neurology. Advises adding Josiah. Feels no need for transfer at this time and patient can be admitted here, Transfer can be arranged if EEG monitoring becomes necessary

## 2023-01-15 NOTE — H&P ADULT - ASSESSMENT
22M with autism (non-verbal) and seizures who presents with seizures.      Seizures - unclear as to why patient is having so many breakthrough seizures now.  No signs or indications of infections.    - admitted to telemetry  - formal neuro consult when available -> may need transfer for EEG/further neurological workup and interventions  - for now, continue with keppra 500mg IV BID  - continue withe valproic acid 600mg PO TID  - continue with clonazepam 0.5mg TID standing   - will add ativan IV PRN seizure activity  - seizure precautions  - f/u HCT    Preventive measures  - would hold off AC 2/2 seizure like activity and risk of self-injury  - SCD only

## 2023-01-15 NOTE — ED PROVIDER NOTE - OBJECTIVE STATEMENT
Patient from group home brought in by ambulance for seizures.  Patient has a history of seizures.  Takes Depakote and uses intranasal Valium as needed.  Today and yesterday patient has been having multiple brief seizures that are unusual for him.  The aide describes 4 to 5-second episodes of eye movements and lack of movement.  This morning patient had a 17-second grand mal seizure typical of his usual seizure activity.  Patient was reported to be postictal after the grand mal episode but now seems to be improving.  No report of fever.  No nausea vomiting or diarrhea.  Patient has not missed any doses of his medications.  No recent change in the dosage.  Patient is nonverbal at baseline and unable to contribute to history. Patient from group home brought in by ambulance for seizures.  Patient has a history of seizures.  Takes Depakote and uses intranasal Valium as needed.  Today and yesterday patient has been having multiple brief seizures that are unusual for him.  The aide describes 4 to 5-second episodes of eye movements and lack of movement.  This morning patient had a 17-second grand mal seizure typical of his usual seizure activity.  Patient was reported to be postictal after the grand mal episode but now seems to be improving.  No report of fever.  No nausea vomiting or diarrhea.  Patient has not missed any doses of his medications.  No recent change in the dosage.  Patient is nonverbal at baseline and unable to contribute to history.    Patient;s neurologist: Dr. Tubbs  [phone]

## 2023-01-15 NOTE — ED ADULT NURSE NOTE - CHPI ED NUR SYMPTOMS NEG
no blurred vision/no confusion/no dizziness/no fever/no loss of consciousness/no nausea/no numbness/no vomiting

## 2023-01-15 NOTE — ED PROVIDER NOTE - CONSIDERATION OF ADMISSION OBSERVATION
Consideration of Admission/Observation Patient to be observed in ED pending results. Will consider admission if ongoing seizure activity

## 2023-01-16 ENCOUNTER — TRANSCRIPTION ENCOUNTER (OUTPATIENT)
Age: 23
End: 2023-01-16

## 2023-01-16 VITALS
TEMPERATURE: 98 F | DIASTOLIC BLOOD PRESSURE: 55 MMHG | RESPIRATION RATE: 18 BRPM | OXYGEN SATURATION: 98 % | HEART RATE: 78 BPM | SYSTOLIC BLOOD PRESSURE: 124 MMHG

## 2023-01-16 LAB
ALBUMIN SERPL ELPH-MCNC: 3.5 G/DL — SIGNIFICANT CHANGE UP (ref 3.3–5)
ALP SERPL-CCNC: 71 U/L — SIGNIFICANT CHANGE UP (ref 30–120)
ALT FLD-CCNC: 73 U/L DA — HIGH (ref 10–60)
ANION GAP SERPL CALC-SCNC: 10 MMOL/L — SIGNIFICANT CHANGE UP (ref 5–17)
AST SERPL-CCNC: 46 U/L — HIGH (ref 10–40)
BILIRUB SERPL-MCNC: 0.4 MG/DL — SIGNIFICANT CHANGE UP (ref 0.2–1.2)
BUN SERPL-MCNC: 19 MG/DL — SIGNIFICANT CHANGE UP (ref 7–23)
CALCIUM SERPL-MCNC: 9.6 MG/DL — SIGNIFICANT CHANGE UP (ref 8.4–10.5)
CHLORIDE SERPL-SCNC: 102 MMOL/L — SIGNIFICANT CHANGE UP (ref 96–108)
CO2 SERPL-SCNC: 26 MMOL/L — SIGNIFICANT CHANGE UP (ref 22–31)
CREAT SERPL-MCNC: 0.77 MG/DL — SIGNIFICANT CHANGE UP (ref 0.5–1.3)
EGFR: 131 ML/MIN/1.73M2 — SIGNIFICANT CHANGE UP
GLUCOSE SERPL-MCNC: 87 MG/DL — SIGNIFICANT CHANGE UP (ref 70–99)
HCT VFR BLD CALC: 43.1 % — SIGNIFICANT CHANGE UP (ref 39–50)
HGB BLD-MCNC: 13.5 G/DL — SIGNIFICANT CHANGE UP (ref 13–17)
MCHC RBC-ENTMCNC: 24.9 PG — LOW (ref 27–34)
MCHC RBC-ENTMCNC: 31.3 GM/DL — LOW (ref 32–36)
MCV RBC AUTO: 79.5 FL — LOW (ref 80–100)
MRSA PCR RESULT.: SIGNIFICANT CHANGE UP
NRBC # BLD: 0 /100 WBCS — SIGNIFICANT CHANGE UP (ref 0–0)
PLATELET # BLD AUTO: 333 K/UL — SIGNIFICANT CHANGE UP (ref 150–400)
POTASSIUM SERPL-MCNC: 4.3 MMOL/L — SIGNIFICANT CHANGE UP (ref 3.5–5.3)
POTASSIUM SERPL-SCNC: 4.3 MMOL/L — SIGNIFICANT CHANGE UP (ref 3.5–5.3)
PROT SERPL-MCNC: 7.9 G/DL — SIGNIFICANT CHANGE UP (ref 6–8.3)
RBC # BLD: 5.42 M/UL — SIGNIFICANT CHANGE UP (ref 4.2–5.8)
RBC # FLD: 15.9 % — HIGH (ref 10.3–14.5)
S AUREUS DNA NOSE QL NAA+PROBE: DETECTED
SODIUM SERPL-SCNC: 138 MMOL/L — SIGNIFICANT CHANGE UP (ref 135–145)
WBC # BLD: 7.3 K/UL — SIGNIFICANT CHANGE UP (ref 3.8–10.5)
WBC # FLD AUTO: 7.3 K/UL — SIGNIFICANT CHANGE UP (ref 3.8–10.5)

## 2023-01-16 PROCEDURE — 87635 SARS-COV-2 COVID-19 AMP PRB: CPT

## 2023-01-16 PROCEDURE — 87641 MR-STAPH DNA AMP PROBE: CPT

## 2023-01-16 PROCEDURE — 84443 ASSAY THYROID STIM HORMONE: CPT

## 2023-01-16 PROCEDURE — 80164 ASSAY DIPROPYLACETIC ACD TOT: CPT

## 2023-01-16 PROCEDURE — 99239 HOSP IP/OBS DSCHRG MGMT >30: CPT

## 2023-01-16 PROCEDURE — 96365 THER/PROPH/DIAG IV INF INIT: CPT

## 2023-01-16 PROCEDURE — 87640 STAPH A DNA AMP PROBE: CPT

## 2023-01-16 PROCEDURE — 83735 ASSAY OF MAGNESIUM: CPT

## 2023-01-16 PROCEDURE — 84100 ASSAY OF PHOSPHORUS: CPT

## 2023-01-16 PROCEDURE — 99285 EMERGENCY DEPT VISIT HI MDM: CPT

## 2023-01-16 PROCEDURE — 36415 COLL VENOUS BLD VENIPUNCTURE: CPT

## 2023-01-16 PROCEDURE — 80053 COMPREHEN METABOLIC PANEL: CPT

## 2023-01-16 PROCEDURE — 70450 CT HEAD/BRAIN W/O DYE: CPT | Mod: MA

## 2023-01-16 PROCEDURE — 85025 COMPLETE CBC W/AUTO DIFF WBC: CPT

## 2023-01-16 PROCEDURE — 96375 TX/PRO/DX INJ NEW DRUG ADDON: CPT

## 2023-01-16 PROCEDURE — 85027 COMPLETE CBC AUTOMATED: CPT

## 2023-01-16 RX ORDER — LEVETIRACETAM 250 MG/1
500 TABLET, FILM COATED ORAL
Refills: 0 | Status: DISCONTINUED | OUTPATIENT
Start: 2023-01-16 | End: 2023-01-16

## 2023-01-16 RX ORDER — LEVETIRACETAM 250 MG/1
1 TABLET, FILM COATED ORAL
Qty: 60 | Refills: 0
Start: 2023-01-16 | End: 2023-02-14

## 2023-01-16 RX ADMIN — Medication 0.5 MILLIGRAM(S): at 06:42

## 2023-01-16 RX ADMIN — Medication 0.5 MILLIGRAM(S): at 14:03

## 2023-01-16 RX ADMIN — Medication 600 MILLIGRAM(S): at 06:42

## 2023-01-16 RX ADMIN — LEVETIRACETAM 500 MILLIGRAM(S): 250 TABLET, FILM COATED ORAL at 16:33

## 2023-01-16 RX ADMIN — Medication 600 MILLIGRAM(S): at 14:03

## 2023-01-16 NOTE — DISCHARGE NOTE NURSING/CASE MANAGEMENT/SOCIAL WORK - PATIENT PORTAL LINK FT
[facility]  [address] [facility] [address]      Delfina Hammonds : 1977 MRN: [id number]        3/20/2017  Time Session Began: 1600 Time Session Ended: 1645  6  Current GAF (1-100): 55    Suicide/Homicide/Violence Ideation: No    If Yes, explain: N/A    Current Outpatient Prescriptions   Medication Sig   • citalopram (CELEXA) 40 MG tablet Take 1 tablet by mouth daily.   • ibuprofen (MOTRIN) 800 MG tablet Take 1 tablet by mouth every 8 hours as needed for Pain.   • Pseudoephedrine-Ibuprofen (ADVIL COLD/SINUS PO) Take  by mouth.     No current facility-administered medications for this visit.        Change in Medication(s) Reported: Yes  If Yes, explain: Add Celexa    Patient/Family Education Provided: Yes  Patient/Family Displays Understanding: Yes    If No, explain: N/A    Chief complaint in patient's own words: \"I am officially over it\" Follow up for depression and anxiety    Progress Note containing chief complaint and symptoms/problems related to the complaint:    DARP: Patient presented alone for follow-up. Mood stable and affect congruent. Patient believes that she has moved on from her former relationship and is finally feeling mentally ready to improve her self-care and personal well-being. Reviewed strategies for a healthier lifestyle and used cognitive behavioral therapy strategies to address patient's deficits in self-esteem. Patient was cooperative and receptive to feedback. Denied suicidal/homicidal ideation and self-harm. Return 2 months. Session #4 of current treatment plan.     Need for Community Resources Assessed: No    Resources Needed: N/A    If Yes, what resources: N/A    Primary Diagnosis: Dysthymic Disorder  : 0 Unspecified    Treatment Plan: Unchanged    Discharge Plan: Titrate Sessions    Next Appointment:  17 Juju Castillo PSYD                                                     You can access the FollowMyHealth Patient Portal offered by [facility] by registering at the following website: [email or web address]. By joining [email or web address]’s FollowMyHealth portal, you will also be able to view your health information using other applications (apps) compatible with our system.

## 2023-01-16 NOTE — DISCHARGE NOTE PROVIDER - NSDCMRMEDTOKEN_GEN_ALL_CORE_FT
clonazePAM 0.5 mg oral tablet: 1 tab(s) orally 3 times a day  probiotic gummies: 30MG orally 2 times a day  valproic acid 250 mg/5 mL oral liquid: 11 milliliter(s) orally 3 times a day  Valtoco 20 mg Dose nasal spray: 20 milligram(s) nasal once, As Needed  Vitamin D3 25 mcg (1000 intl units) oral tablet: 1 tab(s) orally once a day   clonazePAM 0.5 mg oral tablet: 1 tab(s) orally 3 times a day  levETIRAcetam 500 mg oral tablet: 1 tab(s) orally 2 times a day  probiotic gummies: 30MG orally 2 times a day  valproic acid 250 mg/5 mL oral liquid: 11 milliliter(s) orally 3 times a day  Valtoco 20 mg Dose nasal spray: 20 milligram(s) nasal once, As Needed  Vitamin D3 25 mcg (1000 intl units) oral tablet: 1 tab(s) orally once a day

## 2023-01-16 NOTE — DISCHARGE NOTE PROVIDER - NSDCFUADDAPPT_GEN_ALL_CORE_FT
You should follow up with your primary care doctor, and make an appointment with your neurologist as soon as an appointment becomes available.

## 2023-01-16 NOTE — DISCHARGE NOTE PROVIDER - NSDCCPCAREPLAN_GEN_ALL_CORE_FT
PRINCIPAL DISCHARGE DIAGNOSIS  Diagnosis: Seizures  Assessment and Plan of Treatment: continue home valproid acid  Seen by Dr Schwarz (neurology), Started on Keppra, discharge with 2x a day prescription  followup with your neurologist in clinic and schedule an appointment as soon as available

## 2023-01-16 NOTE — DISCHARGE NOTE PROVIDER - ATTENDING DISCHARGE PHYSICAL EXAMINATION:
GENERAL: patient appears well, no acute distress  EYES: sclera clear, no exudates, PERRLA  ENMT: moist mucous membranes, oropharynx clear without erythema, no exudates  NECK: supple, soft, no thyromegaly noted  LUNGS: no rales, wheezing or rhonchi appreciated  HEART: no limb edema appreciated  GASTROINTESTINAL: abdomen is soft, nondistended  INTEGUMENT: good skin turgor, warm, dry and intact, no lesions appreciated  MUSCULOSKELETAL: no clubbing or cyanosis, no obvious deformity  NEUROLOGIC: awake, alert, orientation appears at baseline, able to feed self, no obvious sensory deficits  PSYCHIATRIC: mood is good, affect appropriate, does not make eye contact, appears nonverbal  HEME/LYMPH:  no obvious ecchymosis or petechiae

## 2023-01-16 NOTE — DISCHARGE NOTE PROVIDER - HOSPITAL COURSE
22M with autism (non-verbal) and seizures who presents with seizures.  As per aide, patient has been having grand mal seizures.  Patient noted to have a grand mal seizure in mid-December and another grand-mal seizure 2 weeks ago.  Patient then started having small seizures with eyes rolling back/twitching and possibly extremity twitching on Friday.  Patient then started to have more seizures during the day on Saturday, requiring a rescue dose of Valtoco.  Then the aide starting documentating his seizures starting at 11pm.  Patient had 7 episodes of multiple small seizures yesterday, lasting from 3-13 seconds culminating in a 17 second grand mal seizure.  Patient still continued to have multiple seizures and required another dose of Valtoco.  Patient was eventually sent here for further evaluation.  Per aide, aside from the seizure, he has not been showing any signs or symptoms of any sickness.  No known fevers, cough, SOB, diarrhea.  Did not exhibit any discomfort or pain.  As per aide, no changes in medications recently.  In the ED, patient continued to have multiple small seizures.  Was given ativan 1mg IV x2 and valproic acid 500mg IV.  Labs were mostly unremarkable except for hypermagnesemia at 4.  HCT pending.  Valproic acid was 70.  COVID neg.  Tele-neurology was consulted by the ED and was suggested to load the patient with keppra and maintain keppra 500mg IV BID for now.  Patient is admitted to uncontrolled seizures.       22M with autism (non-verbal) and seizures who presents with seizures.  As per aide, patient has been having grand mal seizures.  Patient noted to have a grand mal seizure in mid-December and another grand-mal seizure 2 weeks ago.  Patient then started having small seizures with eyes rolling back/twitching and possibly extremity twitching on Friday.  Patient then started to have more seizures during the day on Saturday, requiring a rescue dose of Valtoco.  Then the aide starting documentating his seizures starting at 11pm.  Patient had 7 episodes of multiple small seizures yesterday, lasting from 3-13 seconds culminating in a 17 second grand mal seizure.  Patient still continued to have multiple seizures and required another dose of Valtoco.  Patient was eventually sent here for further evaluation.  Per aide, aside from the seizure, he has not been showing any signs or symptoms of any sickness.  No known fevers, cough, SOB, diarrhea.  Did not exhibit any discomfort or pain.  As per aide, no changes in medications recently.  In the ED, patient continued to have multiple small seizures.  Was given ativan 1mg IV x2 and valproic acid 500mg IV.  Labs were mostly unremarkable except for hypermagnesemia at 4.  HCT pending.  Valproic acid was 70.  COVID neg.  Tele-neurology was consulted by the ED and was suggested to load the patient with keppra and maintain keppra 500mg IV BID for now.  Patient is admitted to uncontrolled seizures.        Breakthrough Seizures  - neuro consulted  - started on Keppra, to continue on discharge, 500mg BID  - continue with valproic acid 600mg PO TID  - continue with clonazepam 0.5mg TID standing   - seizure precautions, aspiration precautions  - advised close outpatient followup

## 2023-01-17 ENCOUNTER — INPATIENT (INPATIENT)
Facility: HOSPITAL | Age: 23
LOS: 2 days | Discharge: HOME | DRG: 101 | End: 2023-01-20
Attending: STUDENT IN AN ORGANIZED HEALTH CARE EDUCATION/TRAINING PROGRAM | Admitting: HOSPITALIST
Payer: MEDICAID

## 2023-01-17 ENCOUNTER — EMERGENCY (EMERGENCY)
Facility: HOSPITAL | Age: 23
LOS: 1 days | Discharge: ANOTHER HOSPITAL | End: 2023-01-17
Attending: EMERGENCY MEDICINE | Admitting: EMERGENCY MEDICINE
Payer: COMMERCIAL

## 2023-01-17 VITALS
SYSTOLIC BLOOD PRESSURE: 142 MMHG | OXYGEN SATURATION: 99 % | HEIGHT: 69 IN | HEART RATE: 92 BPM | DIASTOLIC BLOOD PRESSURE: 90 MMHG | WEIGHT: 149.91 LBS | TEMPERATURE: 98 F | RESPIRATION RATE: 18 BRPM

## 2023-01-17 VITALS
HEART RATE: 74 BPM | RESPIRATION RATE: 16 BRPM | TEMPERATURE: 99 F | SYSTOLIC BLOOD PRESSURE: 113 MMHG | DIASTOLIC BLOOD PRESSURE: 71 MMHG | OXYGEN SATURATION: 99 %

## 2023-01-17 LAB
ALBUMIN SERPL ELPH-MCNC: 3.8 G/DL — SIGNIFICANT CHANGE UP (ref 3.3–5)
ALP SERPL-CCNC: 72 U/L — SIGNIFICANT CHANGE UP (ref 30–120)
ALT FLD-CCNC: 76 U/L DA — HIGH (ref 10–60)
ANION GAP SERPL CALC-SCNC: 9 MMOL/L — SIGNIFICANT CHANGE UP (ref 5–17)
AST SERPL-CCNC: 50 U/L — HIGH (ref 10–40)
BASOPHILS # BLD AUTO: 0.03 K/UL — SIGNIFICANT CHANGE UP (ref 0–0.2)
BASOPHILS NFR BLD AUTO: 0.4 % — SIGNIFICANT CHANGE UP (ref 0–2)
BILIRUB SERPL-MCNC: 0.3 MG/DL — SIGNIFICANT CHANGE UP (ref 0.2–1.2)
BUN SERPL-MCNC: 15 MG/DL — SIGNIFICANT CHANGE UP (ref 7–23)
CALCIUM SERPL-MCNC: 9.7 MG/DL — SIGNIFICANT CHANGE UP (ref 8.4–10.5)
CHLORIDE SERPL-SCNC: 102 MMOL/L — SIGNIFICANT CHANGE UP (ref 96–108)
CO2 SERPL-SCNC: 28 MMOL/L — SIGNIFICANT CHANGE UP (ref 22–31)
CREAT SERPL-MCNC: 0.91 MG/DL — SIGNIFICANT CHANGE UP (ref 0.5–1.3)
EGFR: 124 ML/MIN/1.73M2 — SIGNIFICANT CHANGE UP
EOSINOPHIL # BLD AUTO: 0.09 K/UL — SIGNIFICANT CHANGE UP (ref 0–0.5)
EOSINOPHIL NFR BLD AUTO: 1.1 % — SIGNIFICANT CHANGE UP (ref 0–6)
GLUCOSE SERPL-MCNC: 108 MG/DL — HIGH (ref 70–99)
HCT VFR BLD CALC: 43.1 % — SIGNIFICANT CHANGE UP (ref 39–50)
HGB BLD-MCNC: 13.7 G/DL — SIGNIFICANT CHANGE UP (ref 13–17)
IMM GRANULOCYTES NFR BLD AUTO: 0.2 % — SIGNIFICANT CHANGE UP (ref 0–0.9)
LYMPHOCYTES # BLD AUTO: 3.14 K/UL — SIGNIFICANT CHANGE UP (ref 1–3.3)
LYMPHOCYTES # BLD AUTO: 38.1 % — SIGNIFICANT CHANGE UP (ref 13–44)
MCHC RBC-ENTMCNC: 25 PG — LOW (ref 27–34)
MCHC RBC-ENTMCNC: 31.8 GM/DL — LOW (ref 32–36)
MCV RBC AUTO: 78.5 FL — LOW (ref 80–100)
MONOCYTES # BLD AUTO: 0.59 K/UL — SIGNIFICANT CHANGE UP (ref 0–0.9)
MONOCYTES NFR BLD AUTO: 7.2 % — SIGNIFICANT CHANGE UP (ref 2–14)
NEUTROPHILS # BLD AUTO: 4.37 K/UL — SIGNIFICANT CHANGE UP (ref 1.8–7.4)
NEUTROPHILS NFR BLD AUTO: 53 % — SIGNIFICANT CHANGE UP (ref 43–77)
NRBC # BLD: 0 /100 WBCS — SIGNIFICANT CHANGE UP (ref 0–0)
PLATELET # BLD AUTO: 322 K/UL — SIGNIFICANT CHANGE UP (ref 150–400)
POTASSIUM SERPL-MCNC: 3.9 MMOL/L — SIGNIFICANT CHANGE UP (ref 3.5–5.3)
POTASSIUM SERPL-SCNC: 3.9 MMOL/L — SIGNIFICANT CHANGE UP (ref 3.5–5.3)
PROT SERPL-MCNC: 8.5 G/DL — HIGH (ref 6–8.3)
RBC # BLD: 5.49 M/UL — SIGNIFICANT CHANGE UP (ref 4.2–5.8)
RBC # FLD: 15.6 % — HIGH (ref 10.3–14.5)
SARS-COV-2 RNA SPEC QL NAA+PROBE: SIGNIFICANT CHANGE UP
SODIUM SERPL-SCNC: 139 MMOL/L — SIGNIFICANT CHANGE UP (ref 135–145)
VALPROATE SERPL-MCNC: 93 UG/ML — SIGNIFICANT CHANGE UP (ref 50–100)
WBC # BLD: 8.24 K/UL — SIGNIFICANT CHANGE UP (ref 3.8–10.5)
WBC # FLD AUTO: 8.24 K/UL — SIGNIFICANT CHANGE UP (ref 3.8–10.5)

## 2023-01-17 PROCEDURE — 85025 COMPLETE CBC W/AUTO DIFF WBC: CPT

## 2023-01-17 PROCEDURE — 99285 EMERGENCY DEPT VISIT HI MDM: CPT

## 2023-01-17 PROCEDURE — 80177 DRUG SCRN QUAN LEVETIRACETAM: CPT

## 2023-01-17 PROCEDURE — 87635 SARS-COV-2 COVID-19 AMP PRB: CPT

## 2023-01-17 PROCEDURE — 93010 ELECTROCARDIOGRAM REPORT: CPT

## 2023-01-17 PROCEDURE — 96374 THER/PROPH/DIAG INJ IV PUSH: CPT

## 2023-01-17 PROCEDURE — 36415 COLL VENOUS BLD VENIPUNCTURE: CPT

## 2023-01-17 PROCEDURE — 99285 EMERGENCY DEPT VISIT HI MDM: CPT | Mod: 25

## 2023-01-17 PROCEDURE — 80053 COMPREHEN METABOLIC PANEL: CPT

## 2023-01-17 PROCEDURE — 93005 ELECTROCARDIOGRAM TRACING: CPT

## 2023-01-17 PROCEDURE — 80164 ASSAY DIPROPYLACETIC ACD TOT: CPT

## 2023-01-17 RX ORDER — LEVETIRACETAM 250 MG/1
750 TABLET, FILM COATED ORAL ONCE
Refills: 0 | Status: COMPLETED | OUTPATIENT
Start: 2023-01-17 | End: 2023-01-17

## 2023-01-17 RX ADMIN — LEVETIRACETAM 400 MILLIGRAM(S): 250 TABLET, FILM COATED ORAL at 22:24

## 2023-01-17 NOTE — ED ADULT NURSE NOTE - CHIEF COMPLAINT QUOTE
Pt BIB EMS from [facility] c/o multiple seizures all day. pt recently d/c from [facility]

## 2023-01-17 NOTE — ED ADULT TRIAGE NOTE - CHIEF COMPLAINT QUOTE
transfer from [place] for breakthrough seizures. pt had 2 seizures in route from [place]. pt lethargic on arrival. received 500mg keppra pta

## 2023-01-17 NOTE — ED PROVIDER NOTE - PROGRESS NOTE DETAILS
Case discussed with Dr Conti (neuro) advised 750mg IV keppra X 1 and transfer for EEG monitoring Call placed to transfer center. Pt accepted to [facility] ED by Dr Melvin. Father updated on plan and agrees with transfer.

## 2023-01-17 NOTE — ED PROVIDER NOTE - CLINICAL SUMMARY MEDICAL DECISION MAKING FREE TEXT BOX
22-year-old male history of autism and seizures sent from group home for multiple episodes of seizures as per staff member patient has had at least 7 seizures today and 2 while in the ED so far seizure started on January 14.  Seizures are described as very brief lasting between 5 to 15 seconds and noted to have eyes rolling back with associated twitching.  Denies any postictal state.  Currently on Keppra 500 mg twice daily.  Nonverbal at baseline.    Physical exam: Nonverbal acting baseline as per staff member at bedside regular rate and rhythm clear to auscultation bilaterally abdomen soft nontender nondistended no tongue biting noted.    This is the patient's second time in the ER in 2 days discussed case with neurology recommend transfer for EEG.

## 2023-01-17 NOTE — ED ADULT TRIAGE NOTE - CHIEF COMPLAINT QUOTE
Pt BIB EMS from [facility] c/o multiple seizures all day. pt recently d/c from [facility]

## 2023-01-17 NOTE — ED PROVIDER NOTE - CONSIDERATION OF ADMISSION OBSERVATION
Patient was admitted here over the weekend for seizures started on Keppra BID and dc home yesterday but seizures continue- per neurology pt to be transferred to tertiary care center for EEG monitoring Consideration of Admission/Observation

## 2023-01-17 NOTE — ED ADULT NURSE REASSESSMENT NOTE - NS ED NURSE REASSESS COMMENT FT1
PIV placed and labs drawn and sent to lab. Covib swab performed and sent to lab. Keppra hung and infusing. Patient with x3 episodes of seizure-like activity. Patient quickly becomes unresponsive with eyelids/eyelashes that flutter. First two episodes lasted approximately 10 seconds. Third episode lasted approximately 15-20 seconds. Patient with no post-ictal state after becoming conscious. Plan for transfer to [facility].

## 2023-01-17 NOTE — ED PROVIDER NOTE - OBJECTIVE STATEMENT
21 y/o M with hx autism and seizures presents to ED form group home for seizures. As per staff member at bedside pt has had at least 7 seizures today and two while in the ED so far. Seizures started on 1/14. He typically has "grand mal" seizures. However, the seizures he is ow having are lasting very brief lasting anywhere between 5-15 seconds and are characterized by  eyes rolling back/twitching. No postictal state.. Pt was admitted to this hospital for the same on 1/15 and discharged yesterday. He was stated on Keppra 500mg BID which he received two doses today. Pt has appt with his neurologist on 1/19. At this time patient is at baseline mental status per staff member. Pt is nonverbal at baseline. No fever. No diarrhea, vomiting.

## 2023-01-17 NOTE — ED PROVIDER NOTE - NS ED ATTENDING STATEMENT MOD
This was a shared visit with the DRE. I reviewed and verified the documentation and independently performed the documented:

## 2023-01-17 NOTE — ED PROVIDER NOTE - PHYSICAL EXAMINATION
PE:   GEN: Awake, alert, interactive, NAD, non-toxic appearing.   HEAD: Atraumatic  EYES: Sclera white, conjunctiva pink, PERRLA  CARDIAC: Reg rate and rhythm, S1,S2, no murmur/rub/gallop. Strong central and peripheral pulses, Brisk cap refill, no evident pedal edema.   RESP: No distress noted. L/S clear = Bilat without accessory muscle use, wheeze, rhonchi, rales.   ABD: soft, supple, non-tender, no guarding. BS x 4, normoactive.   NEURO: Alert, nonverbal, CN II-XII grossly intact without focal deficit.   MSK: Moving all extremities with no apparent deformities.   SKIN: Warm, dry, normal color, without apparent rashes.    witnessed a 12 second siezure episode where eyes rolls back and pt becomes unconscious/ snoring then wakes up, no jerks

## 2023-01-17 NOTE — ED ADULT NURSE REASSESSMENT NOTE - NS ED NURSE REASSESS COMMENT FT1
Patient resting comfortably in bed. Keppra completed. Report given to Ari AMOS at [facility].

## 2023-01-17 NOTE — ED PROVIDER NOTE - DIFFERENTIAL DIAGNOSIS
includes but not limited to: seizures Differential Diagnosis includes but not limited to: seizures, hypoglycemia, hyponatremia, infection

## 2023-01-18 DIAGNOSIS — G40.909 EPILEPSY, UNSPECIFIED, NOT INTRACTABLE, WITHOUT STATUS EPILEPTICUS: ICD-10-CM

## 2023-01-18 LAB
ALBUMIN SERPL ELPH-MCNC: 4.1 G/DL — SIGNIFICANT CHANGE UP (ref 3.3–5.2)
ALP SERPL-CCNC: 66 U/L — SIGNIFICANT CHANGE UP (ref 40–120)
ALT FLD-CCNC: 67 U/L — HIGH
ANION GAP SERPL CALC-SCNC: 14 MMOL/L — SIGNIFICANT CHANGE UP (ref 5–17)
AST SERPL-CCNC: 57 U/L — HIGH
BASOPHILS # BLD AUTO: 0.02 K/UL — SIGNIFICANT CHANGE UP (ref 0–0.2)
BASOPHILS NFR BLD AUTO: 0.3 % — SIGNIFICANT CHANGE UP (ref 0–2)
BILIRUB SERPL-MCNC: 0.3 MG/DL — LOW (ref 0.4–2)
BUN SERPL-MCNC: 16.7 MG/DL — SIGNIFICANT CHANGE UP (ref 8–20)
CALCIUM SERPL-MCNC: 9.5 MG/DL — SIGNIFICANT CHANGE UP (ref 8.4–10.5)
CHLORIDE SERPL-SCNC: 101 MMOL/L — SIGNIFICANT CHANGE UP (ref 96–108)
CK MB CFR SERPL CALC: 9.3 NG/ML — HIGH (ref 0–6.7)
CK SERPL-CCNC: 634 U/L — HIGH (ref 30–200)
CO2 SERPL-SCNC: 24 MMOL/L — SIGNIFICANT CHANGE UP (ref 22–29)
CREAT SERPL-MCNC: 0.72 MG/DL — SIGNIFICANT CHANGE UP (ref 0.5–1.3)
EGFR: 134 ML/MIN/1.73M2 — SIGNIFICANT CHANGE UP
EOSINOPHIL # BLD AUTO: 0.07 K/UL — SIGNIFICANT CHANGE UP (ref 0–0.5)
EOSINOPHIL NFR BLD AUTO: 1.2 % — SIGNIFICANT CHANGE UP (ref 0–6)
GLUCOSE SERPL-MCNC: 80 MG/DL — SIGNIFICANT CHANGE UP (ref 70–99)
HCT VFR BLD CALC: 44.4 % — SIGNIFICANT CHANGE UP (ref 39–50)
HGB BLD-MCNC: 13.9 G/DL — SIGNIFICANT CHANGE UP (ref 13–17)
IMM GRANULOCYTES NFR BLD AUTO: 0.3 % — SIGNIFICANT CHANGE UP (ref 0–0.9)
LYMPHOCYTES # BLD AUTO: 2.22 K/UL — SIGNIFICANT CHANGE UP (ref 1–3.3)
LYMPHOCYTES # BLD AUTO: 38.1 % — SIGNIFICANT CHANGE UP (ref 13–44)
MAGNESIUM SERPL-MCNC: 1.9 MG/DL — SIGNIFICANT CHANGE UP (ref 1.6–2.6)
MCHC RBC-ENTMCNC: 24.6 PG — LOW (ref 27–34)
MCHC RBC-ENTMCNC: 31.3 GM/DL — LOW (ref 32–36)
MCV RBC AUTO: 78.7 FL — LOW (ref 80–100)
MONOCYTES # BLD AUTO: 0.56 K/UL — SIGNIFICANT CHANGE UP (ref 0–0.9)
MONOCYTES NFR BLD AUTO: 9.6 % — SIGNIFICANT CHANGE UP (ref 2–14)
NEUTROPHILS # BLD AUTO: 2.94 K/UL — SIGNIFICANT CHANGE UP (ref 1.8–7.4)
NEUTROPHILS NFR BLD AUTO: 50.5 % — SIGNIFICANT CHANGE UP (ref 43–77)
PHOSPHATE SERPL-MCNC: 3.6 MG/DL — SIGNIFICANT CHANGE UP (ref 2.4–4.7)
PLATELET # BLD AUTO: 298 K/UL — SIGNIFICANT CHANGE UP (ref 150–400)
POTASSIUM SERPL-MCNC: 4.3 MMOL/L — SIGNIFICANT CHANGE UP (ref 3.5–5.3)
POTASSIUM SERPL-SCNC: 4.3 MMOL/L — SIGNIFICANT CHANGE UP (ref 3.5–5.3)
PROT SERPL-MCNC: 7.8 G/DL — SIGNIFICANT CHANGE UP (ref 6.6–8.7)
RBC # BLD: 5.64 M/UL — SIGNIFICANT CHANGE UP (ref 4.2–5.8)
RBC # FLD: 15.7 % — HIGH (ref 10.3–14.5)
SODIUM SERPL-SCNC: 139 MMOL/L — SIGNIFICANT CHANGE UP (ref 135–145)
VALPROATE SERPL-MCNC: 61.8 UG/ML — SIGNIFICANT CHANGE UP (ref 50–100)
WBC # BLD: 5.83 K/UL — SIGNIFICANT CHANGE UP (ref 3.8–10.5)
WBC # FLD AUTO: 5.83 K/UL — SIGNIFICANT CHANGE UP (ref 3.8–10.5)

## 2023-01-18 PROCEDURE — 99233 SBSQ HOSP IP/OBS HIGH 50: CPT

## 2023-01-18 PROCEDURE — 70450 CT HEAD/BRAIN W/O DYE: CPT | Mod: 26

## 2023-01-18 PROCEDURE — 99254 IP/OBS CNSLTJ NEW/EST MOD 60: CPT

## 2023-01-18 PROCEDURE — 12345: CPT | Mod: NC

## 2023-01-18 PROCEDURE — 99223 1ST HOSP IP/OBS HIGH 75: CPT

## 2023-01-18 PROCEDURE — 95718 EEG PHYS/QHP 2-12 HR W/VEEG: CPT

## 2023-01-18 RX ORDER — LEVETIRACETAM 250 MG/1
1500 TABLET, FILM COATED ORAL
Refills: 0 | Status: DISCONTINUED | OUTPATIENT
Start: 2023-01-19 | End: 2023-01-19

## 2023-01-18 RX ORDER — VALPROIC ACID (AS SODIUM SALT) 250 MG/5ML
700 SOLUTION, ORAL ORAL ONCE
Refills: 0 | Status: COMPLETED | OUTPATIENT
Start: 2023-01-18 | End: 2023-01-18

## 2023-01-18 RX ORDER — ONDANSETRON 8 MG/1
4 TABLET, FILM COATED ORAL EVERY 8 HOURS
Refills: 0 | Status: DISCONTINUED | OUTPATIENT
Start: 2023-01-18 | End: 2023-01-20

## 2023-01-18 RX ORDER — CHLORHEXIDINE GLUCONATE 213 G/1000ML
1 SOLUTION TOPICAL DAILY
Refills: 0 | Status: DISCONTINUED | OUTPATIENT
Start: 2023-01-18 | End: 2023-01-20

## 2023-01-18 RX ORDER — ACETAMINOPHEN 500 MG
650 TABLET ORAL EVERY 6 HOURS
Refills: 0 | Status: DISCONTINUED | OUTPATIENT
Start: 2023-01-18 | End: 2023-01-20

## 2023-01-18 RX ORDER — VALPROIC ACID (AS SODIUM SALT) 250 MG/5ML
600 SOLUTION, ORAL ORAL EVERY 8 HOURS
Refills: 0 | Status: DISCONTINUED | OUTPATIENT
Start: 2023-01-18 | End: 2023-01-18

## 2023-01-18 RX ORDER — LEVETIRACETAM 250 MG/1
3000 TABLET, FILM COATED ORAL ONCE
Refills: 0 | Status: DISCONTINUED | OUTPATIENT
Start: 2023-01-18 | End: 2023-01-18

## 2023-01-18 RX ORDER — LEVETIRACETAM 250 MG/1
1500 TABLET, FILM COATED ORAL ONCE
Refills: 0 | Status: COMPLETED | OUTPATIENT
Start: 2023-01-18 | End: 2023-01-18

## 2023-01-18 RX ORDER — ENOXAPARIN SODIUM 100 MG/ML
40 INJECTION SUBCUTANEOUS EVERY 24 HOURS
Refills: 0 | Status: DISCONTINUED | OUTPATIENT
Start: 2023-01-18 | End: 2023-01-20

## 2023-01-18 RX ORDER — VALPROIC ACID (AS SODIUM SALT) 250 MG/5ML
1000 SOLUTION, ORAL ORAL ONCE
Refills: 0 | Status: COMPLETED | OUTPATIENT
Start: 2023-01-18 | End: 2023-01-18

## 2023-01-18 RX ORDER — CLONAZEPAM 1 MG
0.5 TABLET ORAL THREE TIMES A DAY
Refills: 0 | Status: DISCONTINUED | OUTPATIENT
Start: 2023-01-18 | End: 2023-01-20

## 2023-01-18 RX ORDER — SODIUM CHLORIDE 9 MG/ML
1000 INJECTION, SOLUTION INTRAVENOUS
Refills: 0 | Status: DISCONTINUED | OUTPATIENT
Start: 2023-01-18 | End: 2023-01-18

## 2023-01-18 RX ORDER — VALPROIC ACID (AS SODIUM SALT) 250 MG/5ML
1000 SOLUTION, ORAL ORAL
Refills: 0 | Status: DISCONTINUED | OUTPATIENT
Start: 2023-01-19 | End: 2023-01-19

## 2023-01-18 RX ORDER — LEVETIRACETAM 250 MG/1
4000 TABLET, FILM COATED ORAL ONCE
Refills: 0 | Status: COMPLETED | OUTPATIENT
Start: 2023-01-18 | End: 2023-01-18

## 2023-01-18 RX ORDER — LANOLIN ALCOHOL/MO/W.PET/CERES
3 CREAM (GRAM) TOPICAL AT BEDTIME
Refills: 0 | Status: DISCONTINUED | OUTPATIENT
Start: 2023-01-18 | End: 2023-01-20

## 2023-01-18 RX ORDER — LEVETIRACETAM 250 MG/1
500 TABLET, FILM COATED ORAL EVERY 12 HOURS
Refills: 0 | Status: DISCONTINUED | OUTPATIENT
Start: 2023-01-18 | End: 2023-01-18

## 2023-01-18 RX ADMIN — Medication 56 MILLIGRAM(S): at 10:49

## 2023-01-18 RX ADMIN — Medication 2 MILLIGRAM(S): at 16:54

## 2023-01-18 RX ADMIN — LEVETIRACETAM 400 MILLIGRAM(S): 250 TABLET, FILM COATED ORAL at 10:45

## 2023-01-18 RX ADMIN — LEVETIRACETAM 1160 MILLIGRAM(S): 250 TABLET, FILM COATED ORAL at 18:49

## 2023-01-18 RX ADMIN — Medication 0.5 MILLIGRAM(S): at 10:45

## 2023-01-18 RX ADMIN — Medication 0.5 MILLIGRAM(S): at 16:54

## 2023-01-18 RX ADMIN — Medication 0.5 MILLIGRAM(S): at 21:49

## 2023-01-18 RX ADMIN — CHLORHEXIDINE GLUCONATE 1 APPLICATION(S): 213 SOLUTION TOPICAL at 18:25

## 2023-01-18 RX ADMIN — ENOXAPARIN SODIUM 40 MILLIGRAM(S): 100 INJECTION SUBCUTANEOUS at 10:49

## 2023-01-18 RX ADMIN — LEVETIRACETAM 400 MILLIGRAM(S): 250 TABLET, FILM COATED ORAL at 23:10

## 2023-01-18 RX ADMIN — Medication 60 MILLIGRAM(S): at 23:10

## 2023-01-18 RX ADMIN — Medication 57 MILLIGRAM(S): at 18:21

## 2023-01-18 NOTE — H&P ADULT - HISTORY OF PRESENT ILLNESS
22M with PMHX Severe Autism (Nonverbal baseline), Seizure Disorder BIBEMS for breakthrough seizure to [facility] and transferred to [facility] ER for seizure workup. Patient now with third admission since 1/15/23 for recurrent breakthrough seizures. Previously admitted to [place] where he was continued on Valproate and Clonazepam and started on Keppra. Reported seizure activity en route to [place] and again en route to [facility]. Seizure aborted with Valtoco. Group Home binder reviewed patient was on Clobazam approx 4 months ago which was discontinued. Nonverbal baseline and currently post-ictal unable to provide HPI/ROS. Chart reviewed from [place] - Patient had negative CTH WO on 1/15/23 but had reported fall on 1/17 prior to discharge. Labs reviewed.     Unable to obtain ROS 2/2 nonverbal baseline.

## 2023-01-18 NOTE — ED ADULT NURSE REASSESSMENT NOTE - NS ED NURSE REASSESS COMMENT FT1
Assumed care of patient at approx 00:55. Patient calm, nonverbal at baseline, with aide from group home at bedside. Patient placed on cardiac monitor, NSR noted. Patient does not display any indicators of acute distress. Safety maintained.

## 2023-01-18 NOTE — ED ADULT NURSE NOTE - OBJECTIVE STATEMENT
Patient is a transfer from [facility] for breakthrough seizures. Patient had 2 seizures in route from [facility]. pt lethargic on arrival. received 500mg keppra pta. Aide from group home at bedside.

## 2023-01-18 NOTE — ED PROVIDER NOTE - CLINICAL SUMMARY MEDICAL DECISION MAKING FREE TEXT BOX
21 yo M with hx of grand mal seizures, transferred from [place] for further w/u and evaluation of increasing episodes of eye rolling and twitching with no post-ictal period.  Case d/w Hospitalist and will admit for video EEG

## 2023-01-18 NOTE — PROGRESS NOTE ADULT - SUBJECTIVE AND OBJECTIVE BOX
Patient is a 22y old  Male who presents with a chief complaint of Recurrent Breakthrough Seizure (18 Jan 2023 04:51)    Patient seen and examined at bedside.      ALLERGIES:  No Known Allergies    MEDICATIONS  (STANDING):  clonazePAM  Tablet 0.5 milliGRAM(s) Oral three times a day  enoxaparin Injectable 40 milliGRAM(s) SubCutaneous every 24 hours  lactated ringers. 1000 milliLiter(s) (100 mL/Hr) IV Continuous <Continuous>  levETIRAcetam  IVPB 500 milliGRAM(s) IV Intermittent every 12 hours  valproate sodium  IVPB 600 milliGRAM(s) IV Intermittent every 8 hours    MEDICATIONS  (PRN):  acetaminophen     Tablet .. 650 milliGRAM(s) Oral every 6 hours PRN Temp greater or equal to 38C (100.4F), Mild Pain (1 - 3)  aluminum hydroxide/magnesium hydroxide/simethicone Suspension 30 milliLiter(s) Oral every 4 hours PRN Dyspepsia  LORazepam   Injectable 2 milliGRAM(s) IV Push every 6 hours PRN SEIZURE ONLY  melatonin 3 milliGRAM(s) Oral at bedtime PRN Insomnia  ondansetron Injectable 4 milliGRAM(s) IV Push every 8 hours PRN Nausea and/or Vomiting    Vital Signs Last 24 Hrs  T(F): 98.9 (18 Jan 2023 04:51), Max: 99 (17 Jan 2023 23:55)  HR: 73 (18 Jan 2023 04:51) (62 - 74)  BP: 118/70 (18 Jan 2023 04:51) (95/52 - 127/67)  RR: 18 (18 Jan 2023 04:51) (16 - 18)  SpO2: 98% (18 Jan 2023 04:51) (95% - 99%)  I&O's Summary    PHYSICAL EXAM:  General: NAD, laying in bed   ENT: MMM, no thrush  Neck: Supple, No JVD  Lungs: good air entry, non-labored breathing  Cardio: +s1/s2  Abdomen: Soft, Nontender, Nondistended; Bowel sounds present  Extremities: No calf tenderness     LABS:  - pending     RADIOLOGY & ADDITIONAL TESTS:  - no new tests    Care Discussed with Consultants/Other Providers:

## 2023-01-18 NOTE — CONSULT NOTE ADULT - SUBJECTIVE AND OBJECTIVE BOX
[facility]                                                                           Mica Grant MD                                                                                                          Epilepsy Consult #: [phone] ([phone])                                        Office: [address] [place] [address], 2nd floor, [place], [place], [address]                                                 Phone: [phone]; Fax: [phone]                            ==============================================    EPILEPSY INITIAL CONSULT NOTE      ADMITTING DIAGNOSIS: Nonintractable epilepsy without status epilepticus        HPI:  22M with PMHX Severe Autism (Nonverbal baseline), Seizure Disorder BIBEMS for breakthrough seizure to [facility] and transferred to [facility] ER for seizure workup. Patient now with third admission since 1/15/23 for recurrent breakthrough seizures. Previously admitted to [place] where he was continued on Valproate and Clonazepam and started on Keppra. Reported seizure activity en route to [place] and again en route to [facility]. Seizure aborted with Valtoco. Group Home binder reviewed patient was on Clobazam approx 4 months ago which was discontinued. Nonverbal baseline and currently post-ictal unable to provide HPI/ROS. Chart reviewed from [place] - Patient had negative CTH WO on 1/15/23 but had reported fall on 1/17 prior to discharge. Labs reviewed. Unable to obtain ROS 2/2 nonverbal baseline.  (18 Jan 2023 04:51)      Collateral history collected from celso Almanza at bedside and group home nurse Karen over the phone. Patient has known epilepsy with focal to bilateral tonic clonic seizures. However, in the past few weeks, has been having increased episodes of very brief (<30s) whole body stiffening and upward eye rolling, now up to multiple times daily. Home ASM: divalproex 600mg TID, clonazepam 0.5mg TID. Karen states that patient was on clobazam prn, but never as a maintenance med. At [place], levetiracetam 500mg BID added. Transferred to [facility] for cvEEG and further evaluation of these new seizure-like events. Connected to EEG and confirmed that new events are broad onset seizures, aborted with lorazepam, levetiracetam and valproic acid loading. Patient self-removed EEG electrodes after a few hours of recording.      PMH:  Autism  Seizure disorder    PSH:  No significant past surgical history    MEDICATION:  acetaminophen     Tablet .. 650 milliGRAM(s) Oral every 6 hours PRN Temp greater or equal to 38C (100.4F), Mild Pain (1 - 3)  aluminum hydroxide/magnesium hydroxide/simethicone Suspension 30 milliLiter(s) Oral every 4 hours PRN Dyspepsia  chlorhexidine 2% Cloths 1 Application(s) Topical daily  clonazePAM  Tablet 0.5 milliGRAM(s) Oral three times a day  enoxaparin Injectable 40 milliGRAM(s) SubCutaneous every 24 hours  levETIRAcetam  IVPB 1500 milliGRAM(s) IV Intermittent once  LORazepam   Injectable 2 milliGRAM(s) IV Push every 6 hours PRN SEIZURE ONLY  melatonin 3 milliGRAM(s) Oral at bedtime PRN Insomnia  ondansetron Injectable 4 milliGRAM(s) IV Push every 8 hours PRN Nausea and/or Vomiting  valproate sodium  IVPB 1000 milliGRAM(s) IV Intermittent once    ALLERGIES:  No Known Allergies    FH:  noncontributory    SH:  No EtOH, tobacco, illicit drugs.      ROS:  Unable to obtain as patient is cognitively impaired.    VITALS:  T(C): 36.6 (01-18-23 @ 16:13), Max: 37.2 (01-17-23 @ 23:55)  HR: 65 (01-18-23 @ 16:13) (62 - 75)  BP: 120/74 (01-18-23 @ 16:13) (95/52 - 127/69)  ABP: --  RR: 18 (01-18-23 @ 16:13) (16 - 18)  SpO2: 92% (01-18-23 @ 16:13) (92% - 99%)  CVP(cm H2O): --    GENERAL PHYSICAL EXAM:  GEN: no distress  HEENT: NCAT, OP clear  EYES: no nystagmus  NECK: supple  CV: RRR, no murmur     		  PULM: CTAB, no wheezing  ABD: soft, +BS, NT  EXT: peripheral pulse intact, no cyanosis  SKIN: warm, dry    NEUROLOGICAL EXAM:  Awake and alert, nonverbal  PERRL, no gross facial asymmetry   Moves all extremities spontaneously  Intact to light touch and pinprick in all 4 EXTs  2+ reflex in BL biceps and patella                                    Coordination deferred  Gait deferred     LABS:  Valproic Acid Level, Serum: 61.8 ug/mL [50.0 - 100.0] (01-18-23 @ 08:35)                          13.9   5.83  )-----------( 298      ( 18 Jan 2023 08:35 )             44.4     01-18    139  |  101  |  16.7  ----------------------------<  80  4.3   |  24.0  |  0.72    Ca    9.5      18 Jan 2023 08:35  Phos  3.6     01-18  Mg     1.9     01-18    TPro  7.8  /  Alb  4.1  /  TBili  0.3<L>  /  DBili  x   /  AST  57<H>  /  ALT  67<H>  /  AlkPhos  66  01-18    CAPILLARY BLOOD GLUCOSE        LIVER FUNCTIONS - ( 18 Jan 2023 08:35 )  Alb: 4.1 g/dL / Pro: 7.8 g/dL / ALK PHOS: 66 U/L / ALT: 67 U/L / AST: 57 U/L / GGT: x                 OTHER IMAGING AND STUDIES:    non-elective EMU 1/18/23:  EEG Summary:  Abnormal EEG in the awake, drowsy and asleep states.  - Three seizures with broad electrographic onset on EEG but features of focality in semiology.   - Frequent to abundant, broad (midline max) spikes with shifting maximum in the left or right hemispheres.  - Diffuse excess beta activity.    Impression/Clinical Correlate:  Events of interest captured and consistent with seizures with both focal and generalized features. Excess diffuse beta activity may be seen with medication use such as benzodiazepines or barbiturates.

## 2023-01-18 NOTE — H&P ADULT - ASSESSMENT
ASSESSMENT:  22M with PMHX Severe Autism (Nonverbal baseline), Seizure Disorder BIBEMS for breakthrough seizure to [facility] and transferred to [facility] ER for recurrent breakthrough seizure.    PLAN:  Recurrent Breakthrough Seizures  -Admit to Tele  -Neurochecks q4  -Repeat Labs  -Check Valproate level  -Check CTH WO  -EEG ordered  -Keppra 500mg IV BID  -Valproate 600mg IV TID  -Clonazepam 0.5mg PO TID  -Ativan 2mg IV q6 PRN SEIZURE  -VTE PPX LMWH  -IVF LR 75cc/hr  -NPO  -Epileptology Consulted

## 2023-01-18 NOTE — SWALLOW BEDSIDE ASSESSMENT ADULT - COMMENTS
As per MD note: "22M with PMHX Severe Autism (Nonverbal baseline), Seizure Disorder BIBEMS for breakthrough seizure to [facility] and transferred to [facility] ER for recurrent breakthrough seizure."

## 2023-01-18 NOTE — EEG REPORT - NS EEG TEXT BOX
[facility] Epilepsy Monitoring Unit Report  [facility]: [address], [place] [address], Phone [phone] [place] Office: [address], [address], [place] [address] Phone [phone]  [facility]: [address] [place] [address], Phone [phone] [place] Office: [address] [place] [address], Phone [phone]  Patient Name: Angel Urena    Age: 22 year, : 2000 Patient ID: -, MRN #: -, Mora: -  Physician Ordering Inpatient EEG: [employer] [place]  Referral Source to EMU: non-elective admission – [facility]  EMU Study Started: 15:13 on 23   EMU Study Ended: 20:27 on 23  Study Information:  EEG Recording Technique: The patient underwent continuous Video-EEG monitoring, using Telemetry System hardware on the XLTek Digital System. EEG and video data were stored on a computer hard drive with important events saved in digital archive files. The material was reviewed by a physician (electroencephalographer / epileptologist) on a daily basis. Rafita and seizure detection algorithms were utilized and reviewed. An EEG Technician attended to the patient, and was available throughout daytime work hours.  The epilepsy center neurologist was available in person or on call 24-hours per day.  EEG Placement and Labeling of Electrodes: The EEG was performed utilizing 20 channel referential EEG connections (coronal over temporal over parasagittal montage) using all standard 10-20 electrode placements with EKG, with additional electrodes placed in the inferior temporal region using the modified 10-10 montage electrode placements for elective admissions, or if deemed necessary. Recording was at a sampling rate of 256 samples per second per channel. Time synchronized digital video recording was done simultaneously with EEG recording. A low light infrared camera was used for low light recording.        History: This is a 21yo male with a history of severe autism, nonverbal and epilepsy who was transferred from [place] for evaluation of seizure-like activities.  Collateral history collected from celso Almanza at bedside and group home nurse Karen over the phone. Patient has known epilepsy with focal to bilateral tonic clonic seizures. However, in the past few weeks, has been having increased episodes of very brief (<30s) whole body stiffening and upward eye rolling, now up to multiple times daily. Home ASM: divalproex 600mg TID (level 61.8 on admission), clonazepam 0.5mg TID. Karen states that patient was on clobazam prn, but never as a maintenance med. At [place], levetiracetam 500mg BID added. Transferred to [facility] for cvEEG and further evaluation of these new seizure-like events. Connected to EEG and confirmed that new events are broad onset seizures, aborted with levetiracetam and valproic acid loading. Patient self-removed EEG electrodes after a few hours of recording.  Home Antiseizure Medication and Device divalproex 600mg TID clonazepam 0.5mg TID  Interpretation:  Start Date: 2023 – Day 1                                Start Time – 15:13       Duration – 4h 57m  Daily EEG Visual Analysis Findings: The background was continuous and reactive. During wakefulness, the posterior dominant rhythm consisted of symmetric, well-modulated 10 Hz activity, with amplitude to 30 uV, that attenuated to eye opening.   Background Slowing: No generalized background slowing was present.  Focal Slowing:  None were present.  Sleep Background: Drowsiness was characterized by fragmentation, attenuation, and slowing of the background activity.   Sleep was characterized by the presence of vertex waves, symmetric sleep spindles and K-complexes.  Other Non-Epileptiform Findings: Diffuse excess beta activity.  Interictal Epileptiform Activity:  Frequent to abundant, broad (midline max) spikes with shifting maximum in the left or right hemispheres.  BP, 7uV    Events: === Seizures #1-3 ===	 Seizure type: probably focal onset seizure with rapid generalization Electrographic onset location: broad Electrographic evolution: -> broad State at onset: awake, S2 sleep	  Clinical/EEG Findings d1 15:47:11		s2 d1 15:47:20		SZ #1: generalized LVFA d1 15:47:29		asleep d1 15:47:35		diffuse polyspiking d1 15:47:48		offset  d1 15:49:49		S2 d1 15:49:53		SZ #2: repetitive broad spiking at 1-2 Hz, midline max d1 15:50:04		elevation of R upper body d1 15:50:13		getting up and coughing d1 15:50:26		offset  d1 16:31:58		awake d1 16:32:06		SZ #3: generalized LVFA d1 16:32:18		evolving to generalized spike-wave discharges d1 16:32:21		pt has his back facing the camera, no movement d1 16:32:27		head bobbing, R upper body jerking d1 16:32:32		evolving to diffuse LVFA, max LH d1 16:32:38		offset d1 16:32:39		quick jerk of the right upper shoulder d1 16:32:41		resumes behavior  EKG Findings: baseline HR Event of interest?: yes  Artifacts: Intermittent myogenic and movement artifacts were noted.  ECG: The heart rate on single channel ECG was predominantly between 60-90 BPM.  ASM: /4000/1500mg, /700/1000mg, CZP 0.5mg TID, LZP 2mg x1  EEG Summary: Abnormal EEG in the awake, drowsy and asleep states. - Three seizures with broad electrographic onset on EEG but features of focality in semiology.  - Frequent to abundant, broad (midline max) spikes with shifting maximum in the left or right hemispheres. - Diffuse excess beta activity.  Impression/Clinical Correlate: Events of interest captured and consistent with seizures with both focal and generalized features. Excess diffuse beta activity may be seen with medication use such as benzodiazepines or barbiturates.  ________________________________________  Mica Grant MD Director, Epilepsy/EMU - [facility]

## 2023-01-18 NOTE — SWALLOW BEDSIDE ASSESSMENT ADULT - ASR SWALLOW ASPIRATION MONITOR
No. GABBY screening performed.  STOP BANG Legend: 0-2 = LOW Risk; 3-4 = INTERMEDIATE Risk; 5-8 = HIGH Risk
change of breathing pattern/oral hygiene/position upright (90Y)/cough/gurgly voice/fever/pneumonia/throat clearing/upper respiratory infection

## 2023-01-18 NOTE — SWALLOW BEDSIDE ASSESSMENT ADULT - SWALLOW EVAL: DIAGNOSIS
Oral & pharyngeal stage of swallow grossly functional with NO overt s/s aspiration noted across administered PO trials

## 2023-01-18 NOTE — PROGRESS NOTE ADULT - ASSESSMENT
22M with PMHX Severe Autism (Nonverbal baseline), Seizure Disorder BIBEMS for breakthrough seizure to [facility] and transferred to [facility] ER for recurrent breakthrough seizure.    #Recurrent Breakthrough Seizures  - neurochecks  - epilepsy consult pending  - keppra, valproate, clonazepam  - ativan prn   - cveeg    #severe autism  - nonverbal at baseline  - supportive care     #DVT Prophylaxis  - lovenox SC 22M with PMHX Severe Autism (Nonverbal baseline), Seizure Disorder BIBEMS for breakthrough seizure to [facility] and transferred to [facility] ER for recurrent breakthrough seizure.    #Recurrent Breakthrough Seizures  - neurochecks  - epilepsy consult pending  - keppra, valproate, clonazepam  - ativan prn   - cveeg    #severe autism  - nonverbal at baseline  - supportive care     #DVT Prophylaxis  - lovenox SC    plan of care d/w patient group home staff bedside.

## 2023-01-18 NOTE — SWALLOW BEDSIDE ASSESSMENT ADULT - SWALLOW EVAL: RECOMMENDED FEEDING/EATING TECHNIQUES
slow rate of intake/allow for swallow between intakes/maintain upright posture during/after eating for 30 mins/position upright (90 degrees)/small sips/bites

## 2023-01-18 NOTE — SWALLOW BEDSIDE ASSESSMENT ADULT - SLP GENERAL OBSERVATIONS
Pt received & seen seated upright in bed, awake/alert, non-verbal (baseline) with reduced cognition, RN Lani & group home aide present, 0/10 pain verbal pain scale pre/post

## 2023-01-18 NOTE — CONSULT NOTE ADULT - ASSESSMENT
This is a 21yo male with a history of severe autism, nonverbal and epilepsy who was transferred from [place] for evaluation of seizure-like activities. Personally reviewed all imagines, labs, EEG and other studies.    Impression:  Events of interest captured and consistent with seizures with both focal and generalized features.    Recommendation:  - pt self-removed EEG electrodes   - s/p levetiracetam 4000mg -> 1500mg BID (ordered)   - s/p valproic acid 700mg -> 1000mg BID (ordered)   - continue clonazepam 0.5mg TID  - lorazepam 2mg IV prn convulsive seizure    - anticipate discharge tomorrow if remains seizure free      Thank you for allowing Epilepsy to participate in the care of this patient.   ______________________  Mica Grant MD   Director, Epilepsy/EMU - [facility]   Epilepsy Consult #: [phone] ([phone])

## 2023-01-18 NOTE — ED ADULT NURSE NOTE - CHIEF COMPLAINT QUOTE
transfer from [place] for breakthrough seizures. pt had 2 seizures in route from [place]. pt lethargic on arrival. received 500mg keppra pta

## 2023-01-18 NOTE — ED PROVIDER NOTE - OBJECTIVE STATEMENT
21 yo M transferred from [facility] with staff member from [facility] for admission for further evaluation of increasing seizures.  Pt with known hx of grand mal seizures for which he takes Tegretol and most recently started on Keppra.  Pt has been having new pattern of seizures where he rolls his eyes and twitches with no post-ictal period and last 5-15 seconds.  with 23 yo M transferred from [facility] with staff member from [facility] for admission for further evaluation of increasing seizures.  Pt with known hx of grand mal seizures for which he takes Tegretol and most recently started on Keppra.  Pt has been having new pattern of seizures where he rolls his eyes and twitches with no post-ictal period and last 5-15 seconds.  Pt non-verbal at baseline.  Pt with reported "Seizure" on arrival. 21 yo M transferred from [facility] with staff member from [facility] for admission for further evaluation of increasing seizures.  Pt with known hx of grand mal seizures for which he takes Valproic Acid and most recently started on Keppra.  Pt has been having new pattern of seizures where he rolls his eyes and twitches with no post-ictal period and last 5-15 seconds.  Pt non-verbal at baseline.  Pt with reported "Seizure" on arrival.

## 2023-01-19 ENCOUNTER — TRANSCRIPTION ENCOUNTER (OUTPATIENT)
Age: 23
End: 2023-01-19

## 2023-01-19 LAB
ALBUMIN SERPL ELPH-MCNC: 3.9 G/DL — SIGNIFICANT CHANGE UP (ref 3.3–5.2)
ALP SERPL-CCNC: 67 U/L — SIGNIFICANT CHANGE UP (ref 40–120)
ALT FLD-CCNC: 67 U/L — HIGH
ANION GAP SERPL CALC-SCNC: 11 MMOL/L — SIGNIFICANT CHANGE UP (ref 5–17)
AST SERPL-CCNC: 55 U/L — HIGH
BILIRUB SERPL-MCNC: 0.2 MG/DL — LOW (ref 0.4–2)
BUN SERPL-MCNC: 16.7 MG/DL — SIGNIFICANT CHANGE UP (ref 8–20)
CALCIUM SERPL-MCNC: 9.3 MG/DL — SIGNIFICANT CHANGE UP (ref 8.4–10.5)
CHLORIDE SERPL-SCNC: 103 MMOL/L — SIGNIFICANT CHANGE UP (ref 96–108)
CO2 SERPL-SCNC: 24 MMOL/L — SIGNIFICANT CHANGE UP (ref 22–29)
CREAT SERPL-MCNC: 0.67 MG/DL — SIGNIFICANT CHANGE UP (ref 0.5–1.3)
EGFR: 137 ML/MIN/1.73M2 — SIGNIFICANT CHANGE UP
GLUCOSE SERPL-MCNC: 99 MG/DL — SIGNIFICANT CHANGE UP (ref 70–99)
HCT VFR BLD CALC: 44.1 % — SIGNIFICANT CHANGE UP (ref 39–50)
HGB BLD-MCNC: 13.4 G/DL — SIGNIFICANT CHANGE UP (ref 13–17)
MCHC RBC-ENTMCNC: 24.6 PG — LOW (ref 27–34)
MCHC RBC-ENTMCNC: 30.4 GM/DL — LOW (ref 32–36)
MCV RBC AUTO: 80.9 FL — SIGNIFICANT CHANGE UP (ref 80–100)
MRSA PCR RESULT.: SIGNIFICANT CHANGE UP
PLATELET # BLD AUTO: 266 K/UL — SIGNIFICANT CHANGE UP (ref 150–400)
POTASSIUM SERPL-MCNC: 3.9 MMOL/L — SIGNIFICANT CHANGE UP (ref 3.5–5.3)
POTASSIUM SERPL-SCNC: 3.9 MMOL/L — SIGNIFICANT CHANGE UP (ref 3.5–5.3)
PROT SERPL-MCNC: 7.3 G/DL — SIGNIFICANT CHANGE UP (ref 6.6–8.7)
RBC # BLD: 5.45 M/UL — SIGNIFICANT CHANGE UP (ref 4.2–5.8)
RBC # FLD: 15.7 % — HIGH (ref 10.3–14.5)
S AUREUS DNA NOSE QL NAA+PROBE: DETECTED
SODIUM SERPL-SCNC: 138 MMOL/L — SIGNIFICANT CHANGE UP (ref 135–145)
VALPROATE SERPL-MCNC: 68.6 UG/ML — SIGNIFICANT CHANGE UP (ref 50–100)
WBC # BLD: 6.37 K/UL — SIGNIFICANT CHANGE UP (ref 3.8–10.5)
WBC # FLD AUTO: 6.37 K/UL — SIGNIFICANT CHANGE UP (ref 3.8–10.5)

## 2023-01-19 PROCEDURE — 95720 EEG PHY/QHP EA INCR W/VEEG: CPT

## 2023-01-19 PROCEDURE — 99233 SBSQ HOSP IP/OBS HIGH 50: CPT

## 2023-01-19 RX ORDER — VALPROIC ACID (AS SODIUM SALT) 250 MG/5ML
12 SOLUTION, ORAL ORAL
Qty: 0 | Refills: 0 | DISCHARGE

## 2023-01-19 RX ORDER — INFLUENZA VIRUS VACCINE 15; 15; 15; 15 UG/.5ML; UG/.5ML; UG/.5ML; UG/.5ML
0.5 SUSPENSION INTRAMUSCULAR ONCE
Refills: 0 | Status: DISCONTINUED | OUTPATIENT
Start: 2023-01-19 | End: 2023-01-20

## 2023-01-19 RX ORDER — DIVALPROEX SODIUM 500 MG/1
2 TABLET, DELAYED RELEASE ORAL
Qty: 0 | Refills: 0 | DISCHARGE
Start: 2023-01-19

## 2023-01-19 RX ORDER — CLONAZEPAM 1 MG
1 TABLET ORAL
Qty: 0 | Refills: 0 | DISCHARGE
Start: 2023-01-19

## 2023-01-19 RX ORDER — LEVETIRACETAM 250 MG/1
1 TABLET, FILM COATED ORAL
Qty: 0 | Refills: 0 | DISCHARGE

## 2023-01-19 RX ORDER — LEVETIRACETAM 250 MG/1
2 TABLET, FILM COATED ORAL
Qty: 0 | Refills: 0 | DISCHARGE
Start: 2023-01-19

## 2023-01-19 RX ORDER — LEVETIRACETAM 250 MG/1
1500 TABLET, FILM COATED ORAL
Refills: 0 | Status: DISCONTINUED | OUTPATIENT
Start: 2023-01-19 | End: 2023-01-20

## 2023-01-19 RX ORDER — CLONAZEPAM 1 MG
1 TABLET ORAL
Qty: 0 | Refills: 0 | DISCHARGE

## 2023-01-19 RX ORDER — CHOLECALCIFEROL (VITAMIN D3) 125 MCG
1 CAPSULE ORAL
Qty: 0 | Refills: 0 | DISCHARGE

## 2023-01-19 RX ORDER — DIAZEPAM 5 MG
1 TABLET ORAL
Qty: 0 | Refills: 0 | DISCHARGE

## 2023-01-19 RX ORDER — DIVALPROEX SODIUM 500 MG/1
1000 TABLET, DELAYED RELEASE ORAL
Refills: 0 | Status: DISCONTINUED | OUTPATIENT
Start: 2023-01-19 | End: 2023-01-20

## 2023-01-19 RX ADMIN — ENOXAPARIN SODIUM 40 MILLIGRAM(S): 100 INJECTION SUBCUTANEOUS at 09:40

## 2023-01-19 RX ADMIN — Medication 0.5 MILLIGRAM(S): at 21:39

## 2023-01-19 RX ADMIN — LEVETIRACETAM 1500 MILLIGRAM(S): 250 TABLET, FILM COATED ORAL at 18:16

## 2023-01-19 RX ADMIN — LEVETIRACETAM 400 MILLIGRAM(S): 250 TABLET, FILM COATED ORAL at 05:28

## 2023-01-19 RX ADMIN — DIVALPROEX SODIUM 1000 MILLIGRAM(S): 500 TABLET, DELAYED RELEASE ORAL at 18:16

## 2023-01-19 RX ADMIN — CHLORHEXIDINE GLUCONATE 1 APPLICATION(S): 213 SOLUTION TOPICAL at 14:13

## 2023-01-19 RX ADMIN — Medication 0.5 MILLIGRAM(S): at 05:27

## 2023-01-19 RX ADMIN — Medication 60 MILLIGRAM(S): at 06:30

## 2023-01-19 RX ADMIN — Medication 0.5 MILLIGRAM(S): at 14:11

## 2023-01-19 NOTE — PROGRESS NOTE ADULT - ASSESSMENT
This is a 21yo male with a history of severe autism, nonverbal and epilepsy who was transferred from [place] for evaluation of seizure-like activities. Personally reviewed all imagines, labs, EEG and other studies.    Impression:  Events of interest captured and consistent with seizures with both focal and generalized features.    Recommendation:  - continue levetiracetam 1500mg BID   - continue divalproex sodium DR 1000mg BID  - continue clonazepam 0.5mg TID  - lorazepam 2mg IV prn convulsive seizure    - pending discharge back to group home      No acute intervention from Epilepsy at this time.  Please reconsult if needed.   ______________________  Mica Grant MD   Director, Epilepsy/EMU - [facility]   Epilepsy Consult #: [phone] ([phone])

## 2023-01-19 NOTE — PROGRESS NOTE ADULT - ASSESSMENT
22M with PMHX Severe Autism (Nonverbal baseline), Seizure Disorder BIBEMS for breakthrough seizure to [facility] and transferred to [facility] ER for recurrent breakthrough seizure.    #Recurrent Breakthrough Seizures  - neurochecks  - epilepsy consult pending  - keppra, valproate, clonazepam  - ativan prn   - cveeg    #severe autism  - nonverbal at baseline  - supportive care     #DVT Prophylaxis  - lovenox SC    plan of care d/w patient group home staff bedside.  22M with PMHX Severe Autism (Nonverbal baseline), Seizure Disorder BIBEMS for breakthrough seizure to [facility] and transferred to [facility] ER for recurrent breakthrough seizure.    #Recurrent Breakthrough Seizures  - neurochecks  - epilepsy recs appreciated  - started keppra, increased valproate, and clonazepam  - ativan prn   - cveeg    #severe autism  - nonverbal at baseline  - supportive care     #DVT Prophylaxis  - lovenox SC    plan of care d/w patient group home staff bedside.

## 2023-01-19 NOTE — DISCHARGE NOTE PROVIDER - CARE PROVIDER_API CALL
Mica Grant)  EEGEpilepsy; Neurology  [address] [place] [address]  Phone: [phone]  Fax: [phone]  Follow Up Time:

## 2023-01-19 NOTE — DISCHARGE NOTE PROVIDER - ATTENDING DISCHARGE PHYSICAL EXAMINATION:
Vital Signs Last 24 Hrs  T(F): 98.6 (19 Jan 2023 04:16), Max: 98.6 (19 Jan 2023 04:16)  HR: 55 (19 Jan 2023 04:16) (55 - 65)  BP: 107/71 (19 Jan 2023 04:16) (107/71 - 120/74)  RR: 18 (19 Jan 2023 04:16) (18 - 18)  SpO2: 92% (19 Jan 2023 04:16) (92% - 92%)    Physical Exam:  General: NAD, laying in bed   ENT: MMM, no thrush  Neck: Supple, No JVD  Lungs: good air entry, non-labored breathing  Cardio: +s1/s2  Abdomen: Soft, Nontender, Nondistended; Bowel sounds present  Extremities: No calf tenderness  Vital Signs Last 24 Hrs  T(C): 36.4 (20 Jan 2023 11:17), Max: 36.9 (19 Jan 2023 16:38)  T(F): 97.6 (20 Jan 2023 11:17), Max: 98.4 (19 Jan 2023 16:38)  HR: 84 (20 Jan 2023 11:17) (68 - 84)  BP: 122/60 (20 Jan 2023 11:17) (103/67 - 122/60)  BP(mean): --  RR: 18 (20 Jan 2023 11:17) (18 - 18)  SpO2: 95% (20 Jan 2023 11:17) (94% - 96%)    Parameters below as of 20 Jan 2023 11:17  Patient On (Oxygen Delivery Method): room air    Physical Exam:  General: NAD, laying in bed   ENT: MMM, no thrush  Neck: Supple, No JVD  Lungs: good air entry, non-labored breathing  Cardio: +s1/s2  Abdomen: Soft, Nontender, Nondistended; Bowel sounds present  Extremities: No calf tenderness

## 2023-01-19 NOTE — DISCHARGE NOTE PROVIDER - NSDCCPCAREPLAN_GEN_ALL_CORE_FT
PRINCIPAL DISCHARGE DIAGNOSIS  Diagnosis: Seizure disorder  Assessment and Plan of Treatment: Please continue taking your medications as prescribed.

## 2023-01-19 NOTE — DISCHARGE NOTE PROVIDER - DISCHARGE DATE
OFFICE VISIT    Williams Meraz  [id number]    Chief complaint:  History of bladder cancer    Williams Meraz is a 85 year old male returning for further evaluation and management of bladder cancer.  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  He underwent a 6 week course of induction BCG.  Repeat TUR in April 2022 showed no residual carcinoma.  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  He has no hematuria.  He has recovered well since procedure.    Past medical history reviewed with patient and no significant changes since prior visit.        Summary of your Discharge Medications          Accurate as of May 26, 2022  1:15 PM. Always use your most recent med list.            Take these Medications      Details   amLODIPine 5 MG tablet  Commonly known as: NORVASC   Take 1 tablet by mouth daily.     aspirin 81 MG tablet   Take 1 tablet by mouth daily.     atorvastatin 40 MG tablet  Commonly known as: LIPITOR   TAKE 1 TABLET BY MOUTH  DAILY  Comment: Requesting 1 year supply     B-6 100 MG Tab   Takes 1 Daily     blood glucose test strip  Commonly known as: OneTouch Ultra   Test blood sugar 4 times daily.  Dx: E11.9  Comment: Requesting 1 year supply     cyanocobalamin 1000 MCG tablet   Take 1,000 mcg by mouth daily.     DISPENSE   Diabetic shoe inserts     empagliflozin 10 MG tablet  Commonly known as: JARDIANCE   Take 1 tablet by mouth daily (before breakfast).  Comment: Requesting 1 year supply     gabapentin 300 MG capsule  Commonly known as: NEURONTIN   TAKE 1 CAPSULE BY MOUTH 3  TIMES DAILY  Comment: Requesting 1 year supply     glipiZIDE 5 MG tablet  Commonly known as: GLUCOTROL   TAKE 2 TABLETS BY MOUTH  TWICE DAILY BEFORE MEALS     ipratropium 0.03 % nasal spray  Commonly known as: ATROVENT   Spray 2 sprays in each nostril every 12 hours.     Iron 325 (65 Fe) MG Tab   Takes 1 Daily     magnesium 250 MG tablet   Takes 1 Daily     metFORMIN 500 MG tablet  Commonly known as:  GLUCOPHAGE   TAKE 2 TABLETS BY MOUTH IN  THE MORNING , 1 TABLET AT  LUNCH AND 2 TABLETS IN THE  EVENING  Comment: Requesting 1 year supply     metoPROLOL succinate 50 MG 24 hr tablet  Commonly known as: TOPROL-XL   TAKE 1 TABLET BY MOUTH  DAILY     NIFEdipine CC 30 MG 24 hr tablet  Commonly known as: ADALAT CC   Take 30 mg by mouth daily.     omeprazole 40 MG capsule  Commonly known as: PriLOSEC   TAKE 1 CAPSULE BY MOUTH  DAILY  Comment: Requesting 1 year supply     Pharmacist Choice Autocode Sys w/Device Kit   Test blood sugar 4 times daily as directed. Diagnosis: e11.9.     Pharmacist Choice Lancets Misc   Test blood sugar 4 times daily as directed. Diagnosis: e11.9.     vitamin D (Cholecalciferol) 25 mcg (1,000 units) capsule   Take 1 capsule by mouth every other day.  Comment: 25 mcg = 1,000 units              ALLERGIES:  No Known Allergies    EXAM:    Vital Signs:   Visit Vitals  /60 (BP Location: LUE - Left upper extremity, Patient Position: Sitting, Cuff Size: Regular)   Pulse 68   Temp 97.1 °F (36.2 °C) (Temporal)   Resp 16   Ht 5' 7\" (1.702 m)   Wt 61.7 kg (136 lb)   SpO2 90%   BMI 21.30 kg/m²       Constitutional: The patient is well developed, well nourished, in no acute distress, appears stated age.  Abdomen: soft, nontender, nondistended    Labs:  Surgical Pathology: [id number]  Order: [id number]   Collected 4/27/2022 12:39     Status: Final result     Visible to patient: No (scheduled for 5/28/2022  2:25 PM)     Dx: Hematuria     0 Result Notes    Component    Pathologic Diagnosis   Bladder, mass, transurethral resection:   -Necrosis with granulomatous response, consistent with prior BCG treatment.  -No viable carcinoma identified.        Surgical Pathology: [id number]  Order: [id number]   Collected 11/26/2021 13:13     Status: Final result     Visible to patient: Yes (seen)     Dx: Bladder tumor     1 Result Note    Component    Pathologic Diagnosis                                ** FINAL  DIAGNOSIS **     A.   Bladder, left posterior wall, biopsy:  - Chronic cystitis with ectatic vasculature.  - Negative for dysplasia.     B.   Bladder, right posterior wall, biopsy:  - Chronic cystitis with mild papillary urothelial hyperplasia.  - Negative for dysplasia.     C.   Bladder, dome, transurethral resection:  - Extensive carcinoma in situ with focal areas suspicious for lamina propria invasion.  - Muscularis propria identified.   Electronically signed by Henry Zhang MD on 12/2/2021 at 1454   Preliminary result electronically signed by Henry Zhang MD on 12/1/2021 at 1219   Comments:               Imaging:    CT urogram reviewed October 2021  IMPRESSION:     -No nephroureterolithiasis, suspicious renal parenchymal lesion, or  evidence of urothelial malignancy.     -Mild-moderate bibasilar subpleural pulmonary reticulation/fibrosis,  increased from 2012, though only partially imaged/characterize. Consider  high-resolution chest CT for further characterization.     -Question small subtle bilateral facet-containing femoral hernias.     -Moderate calcific atherosclerosis.    Assessment and Plan:  85-year-old male with history of urothelial carcinoma in Situ.  He completed induction BCG.  Had recurrent mass at time of cystoscopy however mass was consistent with BCG granulomatous reaction with no residual carcinoma.  We discussed options.  He would like to proceed with maintenance BCG.  He will be scheduled for maintenance BCG as soon as BCG is available.  He will need repeat cystoscopy in 3 months.  Repeat upper tract imaging October 2022.    Cleveland Cage MD  [facility]   19-Jan-2023 20-Jan-2023

## 2023-01-19 NOTE — DISCHARGE NOTE PROVIDER - NSDCMRMEDTOKEN_GEN_ALL_CORE_FT
clonazePAM 0.5 mg oral tablet: 1 tab(s) orally 3 times a day  divalproex sodium 500 mg oral delayed release tablet: 2 tab(s) orally 2 times a day  levETIRAcetam 750 mg oral tablet: 2 tab(s) orally 2 times a day  Valtoco 20 mg Dose nasal spray: 1 dose(s) nasal 4 times a day, As Needed  Vitamin D3 25 mcg (1000 intl units) oral tablet, chewable: 1 tab(s) orally once a day   clonazePAM 0.5 mg oral tablet: 1 tab(s) orally 3 times a day  divalproex sodium 500 mg oral delayed release tablet: 2 tab(s) orally 2 times a day  levETIRAcetam 750 mg oral tablet: 2 tab(s) orally 2 times a day  probiotic gummies: 30MG orally 2 times a day  Valtoco 20 mg Dose nasal spray: 1 dose(s) nasal 4 times a day, As Needed  Vitamin D3 25 mcg (1000 intl units) oral tablet, chewable: 1 tab(s) orally once a day   clonazePAM 0.5 mg oral tablet: 1 tab(s) orally 3 times a day  Keppra 100 mg/mL oral solution: 15 milliliter(s) orally 3 times a day   levETIRAcetam 750 mg oral tablet: 2 tab(s) orally 2 times a day  probiotic gummies: 30MG orally 2 times a day  valproic acid 250 mg/5 mL oral liquid: 30 milliliter(s) orally once a day in AM  valproic acid 250 mg/5 mL oral liquid: 20 milliliter(s) orally once a day in the afternoon  Valtoco 20 mg Dose nasal spray: 1 dose(s) nasal 4 times a day, As Needed  Vitamin D3 25 mcg (1000 intl units) oral tablet, chewable: 1 tab(s) orally once a day   clonazePAM 0.5 mg oral tablet: 1 tab(s) orally 3 times a day  Keppra 100 mg/mL oral solution: 15 milliliter(s) orally 3 times a day   probiotic gummies: 30MG orally 2 times a day  valproic acid 250 mg/5 mL oral liquid: 30 milliliter(s) orally once a day in AM  valproic acid 250 mg/5 mL oral liquid: 20 milliliter(s) orally once a day in the afternoon  Valtoco 20 mg Dose nasal spray: 1 dose(s) nasal 4 times a day, As Needed  Vitamin D3 25 mcg (1000 intl units) oral tablet, chewable: 1 tab(s) orally once a day

## 2023-01-19 NOTE — PATIENT PROFILE ADULT - FALL HARM RISK - HARM RISK INTERVENTIONS

## 2023-01-19 NOTE — PROGRESS NOTE ADULT - SUBJECTIVE AND OBJECTIVE BOX
[facility]                                                                           Mica Grant MD                                                                                                          Epilepsy Consult #: [phone]-EPILEPSY ([phone])                                        Office: [address] [place] [address], [address] floor, [place], [place], [address]                                                 Phone: [phone]; Fax: [phone]                            ==============================================    EPILEPSY FOLLOW-UP NOTE      INTERVAL HISTORY:  No seizure witnessed after escalation in ASM last night. Doing well this morning.    MEDICATIONS:   acetaminophen     Tablet .. 650 milliGRAM(s) Oral every 6 hours PRN Temp greater or equal to 38C (100.4F), Mild Pain (1 - 3)  aluminum hydroxide/magnesium hydroxide/simethicone Suspension 30 milliLiter(s) Oral every 4 hours PRN Dyspepsia  chlorhexidine 2% Cloths 1 Application(s) Topical daily  clonazePAM  Tablet 0.5 milliGRAM(s) Oral three times a day  divalproex DR 1000 milliGRAM(s) Oral two times a day  enoxaparin Injectable 40 milliGRAM(s) SubCutaneous every 24 hours  levETIRAcetam 1500 milliGRAM(s) Oral two times a day  LORazepam   Injectable 2 milliGRAM(s) IV Push every 6 hours PRN SEIZURE ONLY  melatonin 3 milliGRAM(s) Oral at bedtime PRN Insomnia  ondansetron Injectable 4 milliGRAM(s) IV Push every 8 hours PRN Nausea and/or Vomiting    LAST 24-HR VITALS:  T(C): 37 (01-19-23 @ 04:16), Max: 37 (01-19-23 @ 04:16)  HR: 55 (01-19-23 @ 04:16) (55 - 65)  BP: 107/71 (01-19-23 @ 04:16) (107/71 - 120/74)  ABP: --  RR: 18 (01-19-23 @ 04:16) (18 - 18)  SpO2: 92% (01-19-23 @ 04:16) (92% - 92%)  CVP(cm H2O): --    GENERAL PHYSICAL EXAM:  GEN: no distress  HEENT: NCAT, OP clear  EYES: no nystagmus  NECK: supple  CV: RRR, no murmur     		  PULM: CTAB, no wheezing  ABD: soft, +BS, NT  EXT: peripheral pulse intact, no cyanosis  SKIN: warm, dry    NEUROLOGICAL EXAM:  Awake and alert, nonverbal  PERRL, no gross facial asymmetry   Full strength in all extremities   Intact to light touch and pinprick in all 4 EXTs  2+ reflex in BL biceps and patella                                    Coordination deferred  Gait deferred     LABS:  Valproic Acid Level, Serum: 68.6 ug/mL [50.0 - 100.0] (01-19-23 @ 06:28)  Valproic Acid Level, Serum: 61.8 ug/mL [50.0 - 100.0] (01-18-23 @ 08:35)                          13.4   6.37  )-----------( 266      ( 19 Jan 2023 06:28 )             44.1     01-19    138  |  103  |  16.7  ----------------------------<  99  3.9   |  24.0  |  0.67    Ca    9.3      19 Jan 2023 06:28  Phos  3.6     01-18  Mg     1.9     01-18    TPro  7.3  /  Alb  3.9  /  TBili  0.2<L>  /  DBili  x   /  AST  55<H>  /  ALT  67<H>  /  AlkPhos  67  01-19    CAPILLARY BLOOD GLUCOSE        LIVER FUNCTIONS - ( 19 Jan 2023 06:28 )  Alb: 3.9 g/dL / Pro: 7.3 g/dL / ALK PHOS: 67 U/L / ALT: 67 U/L / AST: 55 U/L / GGT: x                 OTHER IMAGING AND STUDIES:    non-elective EMU 1/18/23:  EEG Summary:  Abnormal EEG in the awake, drowsy and asleep states.  - Three seizures with broad electrographic onset on EEG but features of focality in semiology.   - Frequent to abundant, broad (midline max) spikes with shifting maximum in the left or right hemispheres.  - Diffuse excess beta activity.    Impression/Clinical Correlate:  Events of interest captured and consistent with seizures with both focal and generalized features. Excess diffuse beta activity may be seen with medication use such as benzodiazepines or barbiturates.

## 2023-01-19 NOTE — PATIENT PROFILE ADULT - NSPROGENPREVTRANSF_GEN_A_NUR
"Attempt call to inform patient of Dr. Castillo's message below. There was no answer, left message indicating to return call in order to hear message. When she calls back please inform patient of message below.      \"...results show that her hemoglobin is very low and her iron stores are very low.  I am going to set up some iron transfusions for her as we discussed.  It is very important that they get these to help keep her safe at and after her delivery . I have put the orders in, so they should be calling her to start this.  She should continue to try for a high iron diet and take her pnv and oral iron. \"   " no

## 2023-01-19 NOTE — DISCHARGE NOTE PROVIDER - HOSPITAL COURSE
22M with PMHX Severe Autism (Nonverbal baseline), Seizure Disorder BIBEMS for breakthrough seizure to [facility] and transferred to [facility] ER for recurrent breakthrough seizure. Patient was seen and examined by epilepsy. Patient started on keppra and valproate was increased. Patient now medically optimized for discharge to group home.

## 2023-01-19 NOTE — PROGRESS NOTE ADULT - SUBJECTIVE AND OBJECTIVE BOX
[facility] Division of Hospital Medicine    Chief Complaint: seizure-like activities     SUBJECTIVE / OVERNIGHT EVENTS: No acute events overnight. HD stable.     Patient denies chest pain, SOB, abd pain, N/V, fever, chills, dysuria or any other complaints. All remainder ROS negative.     MEDICATIONS  (STANDING):  chlorhexidine 2% Cloths 1 Application(s) Topical daily  clonazePAM  Tablet 0.5 milliGRAM(s) Oral three times a day  divalproex DR 1000 milliGRAM(s) Oral two times a day  enoxaparin Injectable 40 milliGRAM(s) SubCutaneous every 24 hours  levETIRAcetam 1500 milliGRAM(s) Oral two times a day    MEDICATIONS  (PRN):  acetaminophen     Tablet .. 650 milliGRAM(s) Oral every 6 hours PRN Temp greater or equal to 38C (100.4F), Mild Pain (1 - 3)  aluminum hydroxide/magnesium hydroxide/simethicone Suspension 30 milliLiter(s) Oral every 4 hours PRN Dyspepsia  LORazepam   Injectable 2 milliGRAM(s) IV Push every 6 hours PRN SEIZURE ONLY  melatonin 3 milliGRAM(s) Oral at bedtime PRN Insomnia  ondansetron Injectable 4 milliGRAM(s) IV Push every 8 hours PRN Nausea and/or Vomiting        I&O's Summary      PHYSICAL EXAM:  Vital Signs Last 24 Hrs  T(C): 37 (19 Jan 2023 04:16), Max: 37 (19 Jan 2023 04:16)  T(F): 98.6 (19 Jan 2023 04:16), Max: 98.6 (19 Jan 2023 04:16)  HR: 55 (19 Jan 2023 04:16) (55 - 65)  BP: 107/71 (19 Jan 2023 04:16) (107/71 - 120/74)  BP(mean): --  RR: 18 (19 Jan 2023 04:16) (18 - 18)  SpO2: 92% (19 Jan 2023 04:16) (92% - 92%)    Parameters below as of 19 Jan 2023 04:16  Patient On (Oxygen Delivery Method): room air            CONSTITUTIONAL: NAD, well-developed, well-groomed  ENMT: Moist oral mucosa, no pharyngeal injection or exudates; normal dentition  RESPIRATORY: Normal respiratory effort; lungs are clear to auscultation bilaterally  CARDIOVASCULAR: Regular rate and rhythm, normal S1 and S2, no murmur/rub/gallop; No lower extremity edema; Peripheral pulses are 2+ bilaterally  ABDOMEN: Nontender to palpation, normoactive bowel sounds, no rebound/guarding; No hepatosplenomegaly  MUSCLOSKELETAL:  Normal gait; no clubbing or cyanosis of digits; no joint swelling or tenderness to palpation  PSYCH: A+O to person, place, and time; affect appropriate  NEUROLOGY: CN 2-12 are intact and symmetric; no gross sensory deficits;   SKIN: No rashes; no palpable lesions    LABS:                        13.4   6.37  )-----------( 266      ( 19 Jan 2023 06:28 )             44.1     01-19    138  |  103  |  16.7  ----------------------------<  99  3.9   |  24.0  |  0.67    Ca    9.3      19 Jan 2023 06:28  Phos  3.6     01-18  Mg     1.9     01-18    TPro  7.3  /  Alb  3.9  /  TBili  0.2<L>  /  DBili  x   /  AST  55<H>  /  ALT  67<H>  /  AlkPhos  67  01-19      CARDIAC MARKERS ( 18 Jan 2023 08:35 )  x     / x     / 634 U/L / x     / 9.3 ng/mL          CAPILLARY BLOOD GLUCOSE            RADIOLOGY & ADDITIONAL TESTS:  Results Reviewed:   Imaging Personally Reviewed:  Electrocardiogram Personally Reviewed:                                           [facility] Division of Hospital Medicine    Chief Complaint: seizure-like activities     SUBJECTIVE / OVERNIGHT EVENTS: No acute events overnight. HD stable.     Patient denies chest pain, SOB, abd pain, N/V, fever, chills, dysuria or any other complaints. All remainder ROS negative.     MEDICATIONS  (STANDING):  chlorhexidine 2% Cloths 1 Application(s) Topical daily  clonazePAM  Tablet 0.5 milliGRAM(s) Oral three times a day  divalproex DR 1000 milliGRAM(s) Oral two times a day  enoxaparin Injectable 40 milliGRAM(s) SubCutaneous every 24 hours  levETIRAcetam 1500 milliGRAM(s) Oral two times a day    MEDICATIONS  (PRN):  acetaminophen     Tablet .. 650 milliGRAM(s) Oral every 6 hours PRN Temp greater or equal to 38C (100.4F), Mild Pain (1 - 3)  aluminum hydroxide/magnesium hydroxide/simethicone Suspension 30 milliLiter(s) Oral every 4 hours PRN Dyspepsia  LORazepam   Injectable 2 milliGRAM(s) IV Push every 6 hours PRN SEIZURE ONLY  melatonin 3 milliGRAM(s) Oral at bedtime PRN Insomnia  ondansetron Injectable 4 milliGRAM(s) IV Push every 8 hours PRN Nausea and/or Vomiting        I&O's Summary      PHYSICAL EXAM:  Vital Signs Last 24 Hrs  T(C): 37 (19 Jan 2023 04:16), Max: 37 (19 Jan 2023 04:16)  T(F): 98.6 (19 Jan 2023 04:16), Max: 98.6 (19 Jan 2023 04:16)  HR: 55 (19 Jan 2023 04:16) (55 - 65)  BP: 107/71 (19 Jan 2023 04:16) (107/71 - 120/74)  BP(mean): --  RR: 18 (19 Jan 2023 04:16) (18 - 18)  SpO2: 92% (19 Jan 2023 04:16) (92% - 92%)    Parameters below as of 19 Jan 2023 04:16  Patient On (Oxygen Delivery Method): room air    General: NAD, laying in bed   ENT: MMM, no thrush  Neck: Supple, No JVD  Lungs: good air entry, non-labored breathing  Cardio: +s1/s2  Abdomen: Soft, Nontender, Nondistended; Bowel sounds present  Extremities: No calf tenderness     LABS:                        13.4   6.37  )-----------( 266      ( 19 Jan 2023 06:28 )             44.1     01-19    138  |  103  |  16.7  ----------------------------<  99  3.9   |  24.0  |  0.67    Ca    9.3      19 Jan 2023 06:28  Phos  3.6     01-18  Mg     1.9     01-18    TPro  7.3  /  Alb  3.9  /  TBili  0.2<L>  /  DBili  x   /  AST  55<H>  /  ALT  67<H>  /  AlkPhos  67  01-19      CARDIAC MARKERS ( 18 Jan 2023 08:35 )  x     / x     / 634 U/L / x     / 9.3 ng/mL          CAPILLARY BLOOD GLUCOSE            RADIOLOGY & ADDITIONAL TESTS:  Results Reviewed:   Imaging Personally Reviewed:  Electrocardiogram Personally Reviewed:

## 2023-01-20 ENCOUNTER — TRANSCRIPTION ENCOUNTER (OUTPATIENT)
Age: 23
End: 2023-01-20

## 2023-01-20 VITALS
TEMPERATURE: 98 F | DIASTOLIC BLOOD PRESSURE: 64 MMHG | HEART RATE: 72 BPM | SYSTOLIC BLOOD PRESSURE: 106 MMHG | RESPIRATION RATE: 18 BRPM | OXYGEN SATURATION: 95 %

## 2023-01-20 LAB
ALBUMIN SERPL ELPH-MCNC: 4.2 G/DL — SIGNIFICANT CHANGE UP (ref 3.3–5.2)
ALP SERPL-CCNC: 73 U/L — SIGNIFICANT CHANGE UP (ref 40–120)
ALT FLD-CCNC: 77 U/L — HIGH
ANION GAP SERPL CALC-SCNC: 13 MMOL/L — SIGNIFICANT CHANGE UP (ref 5–17)
AST SERPL-CCNC: 60 U/L — HIGH
BILIRUB SERPL-MCNC: 0.2 MG/DL — LOW (ref 0.4–2)
BUN SERPL-MCNC: 15.2 MG/DL — SIGNIFICANT CHANGE UP (ref 8–20)
CALCIUM SERPL-MCNC: 9.6 MG/DL — SIGNIFICANT CHANGE UP (ref 8.4–10.5)
CHLORIDE SERPL-SCNC: 101 MMOL/L — SIGNIFICANT CHANGE UP (ref 96–108)
CO2 SERPL-SCNC: 24 MMOL/L — SIGNIFICANT CHANGE UP (ref 22–29)
CREAT SERPL-MCNC: 0.64 MG/DL — SIGNIFICANT CHANGE UP (ref 0.5–1.3)
EGFR: 139 ML/MIN/1.73M2 — SIGNIFICANT CHANGE UP
GLUCOSE SERPL-MCNC: 88 MG/DL — SIGNIFICANT CHANGE UP (ref 70–99)
HCT VFR BLD CALC: 44.9 % — SIGNIFICANT CHANGE UP (ref 39–50)
HGB BLD-MCNC: 14.1 G/DL — SIGNIFICANT CHANGE UP (ref 13–17)
LEVETIRACETAM SERPL-MCNC: 9.3 UG/ML — LOW (ref 10–40)
MCHC RBC-ENTMCNC: 24.8 PG — LOW (ref 27–34)
MCHC RBC-ENTMCNC: 31.4 GM/DL — LOW (ref 32–36)
MCV RBC AUTO: 79 FL — LOW (ref 80–100)
PLATELET # BLD AUTO: 299 K/UL — SIGNIFICANT CHANGE UP (ref 150–400)
POTASSIUM SERPL-MCNC: 4.1 MMOL/L — SIGNIFICANT CHANGE UP (ref 3.5–5.3)
POTASSIUM SERPL-SCNC: 4.1 MMOL/L — SIGNIFICANT CHANGE UP (ref 3.5–5.3)
PROT SERPL-MCNC: 7.9 G/DL — SIGNIFICANT CHANGE UP (ref 6.6–8.7)
RBC # BLD: 5.68 M/UL — SIGNIFICANT CHANGE UP (ref 4.2–5.8)
RBC # FLD: 15.5 % — HIGH (ref 10.3–14.5)
SARS-COV-2 RNA SPEC QL NAA+PROBE: SIGNIFICANT CHANGE UP
SODIUM SERPL-SCNC: 138 MMOL/L — SIGNIFICANT CHANGE UP (ref 135–145)
VALPROATE SERPL-MCNC: 76.4 UG/ML — SIGNIFICANT CHANGE UP (ref 50–100)
WBC # BLD: 6.51 K/UL — SIGNIFICANT CHANGE UP (ref 3.8–10.5)
WBC # FLD AUTO: 6.51 K/UL — SIGNIFICANT CHANGE UP (ref 3.8–10.5)

## 2023-01-20 PROCEDURE — 87641 MR-STAPH DNA AMP PROBE: CPT

## 2023-01-20 PROCEDURE — 85027 COMPLETE CBC AUTOMATED: CPT

## 2023-01-20 PROCEDURE — 95711 VEEG 2-12 HR UNMONITORED: CPT

## 2023-01-20 PROCEDURE — 95813 EEG EXTND MNTR 61-119 MIN: CPT | Mod: 26

## 2023-01-20 PROCEDURE — U0005: CPT

## 2023-01-20 PROCEDURE — 80053 COMPREHEN METABOLIC PANEL: CPT

## 2023-01-20 PROCEDURE — 99239 HOSP IP/OBS DSCHRG MGMT >30: CPT

## 2023-01-20 PROCEDURE — 99233 SBSQ HOSP IP/OBS HIGH 50: CPT

## 2023-01-20 PROCEDURE — 95700 EEG CONT REC W/VID EEG TECH: CPT

## 2023-01-20 PROCEDURE — 36415 COLL VENOUS BLD VENIPUNCTURE: CPT

## 2023-01-20 PROCEDURE — 83735 ASSAY OF MAGNESIUM: CPT

## 2023-01-20 PROCEDURE — 80164 ASSAY DIPROPYLACETIC ACD TOT: CPT

## 2023-01-20 PROCEDURE — 82553 CREATINE MB FRACTION: CPT

## 2023-01-20 PROCEDURE — 95714 VEEG EA 12-26 HR UNMNTR: CPT

## 2023-01-20 PROCEDURE — 87640 STAPH A DNA AMP PROBE: CPT

## 2023-01-20 PROCEDURE — 80165 DIPROPYLACETIC ACID FREE: CPT

## 2023-01-20 PROCEDURE — 85025 COMPLETE CBC W/AUTO DIFF WBC: CPT

## 2023-01-20 PROCEDURE — U0003: CPT

## 2023-01-20 PROCEDURE — 70450 CT HEAD/BRAIN W/O DYE: CPT

## 2023-01-20 PROCEDURE — 82550 ASSAY OF CK (CPK): CPT

## 2023-01-20 PROCEDURE — 84100 ASSAY OF PHOSPHORUS: CPT

## 2023-01-20 RX ORDER — VALPROIC ACID (AS SODIUM SALT) 250 MG/5ML
30 SOLUTION, ORAL ORAL
Qty: 900 | Refills: 0
Start: 2023-01-20 | End: 2023-02-18

## 2023-01-20 RX ORDER — LEVETIRACETAM 250 MG/1
15 TABLET, FILM COATED ORAL
Qty: 1350 | Refills: 0
Start: 2023-01-20 | End: 2023-02-18

## 2023-01-20 RX ORDER — DIVALPROEX SODIUM 500 MG/1
2 TABLET, DELAYED RELEASE ORAL
Qty: 120 | Refills: 0
Start: 2023-01-20 | End: 2023-02-18

## 2023-01-20 RX ORDER — CLONAZEPAM 1 MG
1 TABLET ORAL
Qty: 0 | Refills: 0 | DISCHARGE

## 2023-01-20 RX ORDER — LEVETIRACETAM 250 MG/1
2 TABLET, FILM COATED ORAL
Qty: 120 | Refills: 0
Start: 2023-01-20 | End: 2023-02-18

## 2023-01-20 RX ORDER — VALPROIC ACID (AS SODIUM SALT) 250 MG/5ML
1000 SOLUTION, ORAL ORAL AT BEDTIME
Refills: 0 | Status: DISCONTINUED | OUTPATIENT
Start: 2023-01-20 | End: 2023-01-20

## 2023-01-20 RX ORDER — VALPROIC ACID (AS SODIUM SALT) 250 MG/5ML
500 SOLUTION, ORAL ORAL ONCE
Refills: 0 | Status: COMPLETED | OUTPATIENT
Start: 2023-01-20 | End: 2023-01-20

## 2023-01-20 RX ORDER — LEVETIRACETAM 250 MG/1
1500 TABLET, FILM COATED ORAL
Refills: 0 | Status: DISCONTINUED | OUTPATIENT
Start: 2023-01-20 | End: 2023-01-20

## 2023-01-20 RX ORDER — VALPROIC ACID (AS SODIUM SALT) 250 MG/5ML
11 SOLUTION, ORAL ORAL
Qty: 0 | Refills: 0 | DISCHARGE

## 2023-01-20 RX ORDER — VALPROIC ACID (AS SODIUM SALT) 250 MG/5ML
1500 SOLUTION, ORAL ORAL
Refills: 0 | Status: DISCONTINUED | OUTPATIENT
Start: 2023-01-20 | End: 2023-01-20

## 2023-01-20 RX ORDER — VALPROIC ACID (AS SODIUM SALT) 250 MG/5ML
20 SOLUTION, ORAL ORAL
Qty: 600 | Refills: 0
Start: 2023-01-20 | End: 2023-02-18

## 2023-01-20 RX ORDER — LEVETIRACETAM 250 MG/1
1500 TABLET, FILM COATED ORAL ONCE
Refills: 0 | Status: COMPLETED | OUTPATIENT
Start: 2023-01-20 | End: 2023-01-20

## 2023-01-20 RX ORDER — CHOLECALCIFEROL (VITAMIN D3) 125 MCG
1 CAPSULE ORAL
Qty: 0 | Refills: 0 | DISCHARGE

## 2023-01-20 RX ORDER — DIAZEPAM 5 MG
20 TABLET ORAL
Qty: 0 | Refills: 0 | DISCHARGE

## 2023-01-20 RX ADMIN — ENOXAPARIN SODIUM 40 MILLIGRAM(S): 100 INJECTION SUBCUTANEOUS at 11:48

## 2023-01-20 RX ADMIN — Medication 0.5 MILLIGRAM(S): at 11:48

## 2023-01-20 RX ADMIN — CHLORHEXIDINE GLUCONATE 1 APPLICATION(S): 213 SOLUTION TOPICAL at 11:48

## 2023-01-20 RX ADMIN — Medication 55 MILLIGRAM(S): at 17:24

## 2023-01-20 RX ADMIN — LEVETIRACETAM 400 MILLIGRAM(S): 250 TABLET, FILM COATED ORAL at 16:56

## 2023-01-20 RX ADMIN — LEVETIRACETAM 1500 MILLIGRAM(S): 250 TABLET, FILM COATED ORAL at 07:03

## 2023-01-20 RX ADMIN — DIVALPROEX SODIUM 1000 MILLIGRAM(S): 500 TABLET, DELAYED RELEASE ORAL at 07:03

## 2023-01-20 RX ADMIN — Medication 0.5 MILLIGRAM(S): at 07:03

## 2023-01-20 NOTE — PROGRESS NOTE ADULT - SUBJECTIVE AND OBJECTIVE BOX
[facility]                                                                           Mica Grant MD                                                                                                          Epilepsy Consult #: [phone] ([phone])                                        Office: [address] [place] [address], 2nd floor, [place], [place], [address]                                                 Phone: [phone]; Fax: [phone]                            ==============================================    EPILEPSY FOLLOW-UP NOTE      INTERVAL HISTORY:  Discharge on hold yesterday due to RN's report of seizures. Reconnected to cvEEG. No seizure on cvEEG.    MEDICATIONS  (STANDING):  chlorhexidine 2% Cloths 1 Application(s) Topical daily  clonazePAM  Tablet 0.5 milliGRAM(s) Oral three times a day  divalproex DR 1000 milliGRAM(s) Oral two times a day  enoxaparin Injectable 40 milliGRAM(s) SubCutaneous every 24 hours  influenza   Vaccine 0.5 milliLiter(s) IntraMuscular once  levETIRAcetam 1500 milliGRAM(s) Oral two times a day    Vital Signs Last 24 Hrs  T(C): 36.4 (20 Jan 2023 11:17), Max: 36.9 (19 Jan 2023 16:38)  T(F): 97.6 (20 Jan 2023 11:17), Max: 98.4 (19 Jan 2023 16:38)  HR: 84 (20 Jan 2023 11:17) (68 - 84)  BP: 122/60 (20 Jan 2023 11:17) (103/67 - 122/60)  BP(mean): --  RR: 18 (20 Jan 2023 11:17) (18 - 18)  SpO2: 95% (20 Jan 2023 11:17) (94% - 96%)    Parameters below as of 20 Jan 2023 11:17  Patient On (Oxygen Delivery Method): room air    GENERAL PHYSICAL EXAM:  GEN: no distress  HEENT: NCAT, OP clear  EYES: no nystagmus  NECK: supple  CV: RRR, no murmur     		  PULM: CTAB, no wheezing  ABD: soft, +BS, NT  EXT: peripheral pulse intact, no cyanosis  SKIN: warm, dry    NEUROLOGICAL EXAM:  Awake and alert, nonverbal  PERRL, no gross facial asymmetry   Full strength in all extremities   Intact to light touch and pinprick in all 4 EXTs  2+ reflex in BL biceps and patella                                    Coordination deferred  Gait deferred     LABS:  Valproic Acid Level, Serum: 76.4 ug/mL [50.0 - 100.0] (01-20-23 @ 06:12)  Valproic Acid Level, Serum: 68.6 ug/mL [50.0 - 100.0] (01-19-23 @ 06:28)  Valproic Acid Level, Serum: 61.8 ug/mL [50.0 - 100.0] (01-18-23 @ 08:35)  Valproic Acid Level, Serum: 93 ug/mL [50 - 100] (01-17-23 @ 21:19)  Valproic Acid Level, Serum: 70 ug/mL [50 - 100] (01-15-23 @ 04:07)  Valproic Acid Level, Serum: 53 ug/mL [50 - 100] (03-29-22 @ 09:34)  Valproic Acid Level, Serum: 58 ug/mL [50 - 100] (10-05-21 @ 12:31)                          14.1   6.51  )-----------( 299      ( 20 Jan 2023 06:12 )             44.9     01-20    138  |  101  |  15.2  ----------------------------<  88  4.1   |  24.0  |  0.64    Ca    9.6      20 Jan 2023 06:12    TPro  7.9  /  Alb  4.2  /  TBili  0.2<L>  /  DBili  x   /  AST  60<H>  /  ALT  77<H>  /  AlkPhos  73  01-20    CAPILLARY BLOOD GLUCOSE      LIVER FUNCTIONS - ( 20 Jan 2023 06:12 )  Alb: 4.2 g/dL / Pro: 7.9 g/dL / ALK PHOS: 73 U/L / ALT: 77 U/L / AST: 60 U/L / GGT: x               OTHER IMAGING AND STUDIES:    non-elective EMU 1/19 - 1/20/23:  EEG Summary:  Abnormal EEG in the awake, drowsy and asleep states.  - During sleep, frequent to abundant, broad (midline max) spikes with shifting maximum in the left or right hemispheres, at times appearing as generalized periodic discharges (GPD).  - Diffuse excess beta activity.    Impression/Clinical Correlate:  No events or seizures were recorded. Interictal findings suggest focal epilepsy with rapid bilateral synchrony. Excess diffuse beta activity may be seen with medication use such as benzodiazepines or barbiturates.        non-elective EMU 1/18/23:  EEG Summary:  Abnormal EEG in the awake, drowsy and asleep states.  - Three seizures with broad electrographic onset on EEG but features of focality in semiology.   - Frequent to abundant, broad (midline max) spikes with shifting maximum in the left or right hemispheres.  - Diffuse excess beta activity.    Impression/Clinical Correlate:  Events of interest captured and consistent with seizures with both focal and generalized features. Excess diffuse beta activity may be seen with medication use such as benzodiazepines or barbiturates.

## 2023-01-20 NOTE — DISCHARGE NOTE NURSING/CASE MANAGEMENT/SOCIAL WORK - PATIENT PORTAL LINK FT
You can access the FollowMyHealth Patient Portal offered by [facility] by registering at the following website: [email or web address]. By joining [employer]’s FollowMyHealth portal, you will also be able to view your health information using other applications (apps) compatible with our system.

## 2023-01-20 NOTE — PROGRESS NOTE ADULT - REASON FOR ADMISSION
Recurrent Breakthrough Seizure

## 2023-01-20 NOTE — PROGRESS NOTE ADULT - SUBJECTIVE AND OBJECTIVE BOX
[facility] Division of Hospital Medicine    Chief Complaint: seizure-like activities       SUBJECTIVE / OVERNIGHT EVENTS: No acute events overnight. HD stable.     MEDICATIONS  (STANDING):  chlorhexidine 2% Cloths 1 Application(s) Topical daily  clonazePAM  Tablet 0.5 milliGRAM(s) Oral three times a day  divalproex DR 1000 milliGRAM(s) Oral two times a day  enoxaparin Injectable 40 milliGRAM(s) SubCutaneous every 24 hours  influenza   Vaccine 0.5 milliLiter(s) IntraMuscular once  levETIRAcetam 1500 milliGRAM(s) Oral two times a day    MEDICATIONS  (PRN):  acetaminophen     Tablet .. 650 milliGRAM(s) Oral every 6 hours PRN Temp greater or equal to 38C (100.4F), Mild Pain (1 - 3)  aluminum hydroxide/magnesium hydroxide/simethicone Suspension 30 milliLiter(s) Oral every 4 hours PRN Dyspepsia  LORazepam   Injectable 2 milliGRAM(s) IV Push every 6 hours PRN SEIZURE ONLY  melatonin 3 milliGRAM(s) Oral at bedtime PRN Insomnia  ondansetron Injectable 4 milliGRAM(s) IV Push every 8 hours PRN Nausea and/or Vomiting        I&O's Summary    20 Jan 2023 07:01  -  20 Jan 2023 12:37  --------------------------------------------------------  IN: 480 mL / OUT: 0 mL / NET: 480 mL        PHYSICAL EXAM:  Vital Signs Last 24 Hrs  T(C): 36.4 (20 Jan 2023 11:17), Max: 36.9 (19 Jan 2023 16:38)  T(F): 97.6 (20 Jan 2023 11:17), Max: 98.4 (19 Jan 2023 16:38)  HR: 84 (20 Jan 2023 11:17) (68 - 84)  BP: 122/60 (20 Jan 2023 11:17) (103/67 - 122/60)  BP(mean): --  RR: 18 (20 Jan 2023 11:17) (18 - 18)  SpO2: 95% (20 Jan 2023 11:17) (94% - 96%)    Parameters below as of 20 Jan 2023 11:17  Patient On (Oxygen Delivery Method): room air    General: NAD, laying in bed   ENT: MMM, no thrush  Neck: Supple, No JVD  Lungs: good air entry, non-labored breathing  Cardio: +s1/s2  Abdomen: Soft, Nontender, Nondistended; Bowel sounds present  Extremities: No calf tenderness     LABS:                        14.1   6.51  )-----------( 299      ( 20 Jan 2023 06:12 )             44.9     01-20    138  |  101  |  15.2  ----------------------------<  88  4.1   |  24.0  |  0.64    Ca    9.6      20 Jan 2023 06:12    TPro  7.9  /  Alb  4.2  /  TBili  0.2<L>  /  DBili  x   /  AST  60<H>  /  ALT  77<H>  /  AlkPhos  73  01-20              CAPILLARY BLOOD GLUCOSE            RADIOLOGY & ADDITIONAL TESTS:  Results Reviewed:   Imaging Personally Reviewed:  Electrocardiogram Personally Reviewed:

## 2023-01-20 NOTE — EEG REPORT - NS EEG TEXT BOX
[facility]  Epilepsy Monitoring Unit Report    [facility]: [address], [place] [address], Phone [phone]  [place] Office: [address], [address], [place] [address] Phone [phone]    [facility]: [address] [place] [address], Phone [phone]  [place] Office: [address] [place] [address], Phone [phone]    Patient Name: Angel Urena    Age: 22 year, : 2000  Patient ID: -, MRN #: -, Mora: -    Physician Ordering Inpatient EEG: Guille Membreno  Referral Source to EMU: non-elective admission – [facility]    EMU Study Started: 19:20 on 23    EMU Study Ended: pending discharge    Study Information:    EEG Recording Technique:  The patient underwent continuous Video-EEG monitoring, using Telemetry System hardware on the XLTek Digital System. EEG and video data were stored on a computer hard drive with important events saved in digital archive files. The material was reviewed by a physician (electroencephalographer / epileptologist) on a daily basis. Rafita and seizure detection algorithms were utilized and reviewed. An EEG Technician attended to the patient, and was available throughout daytime work hours.  The epilepsy center neurologist was available in person or on call 24-hours per day.    EEG Placement and Labeling of Electrodes:  The EEG was performed utilizing 20 channel referential EEG connections (coronal over temporal over parasagittal montage) using all standard 10-20 electrode placements with EKG, with additional electrodes placed in the inferior temporal region using the modified 10-10 montage electrode placements for elective admissions, or if deemed necessary. Recording was at a sampling rate of 256 samples per second per channel. Time synchronized digital video recording was done simultaneously with EEG recording. A low light infrared camera was used for low light recording.               History:  This is a 23yo male with a history of severe autism, nonverbal and epilepsy who was transferred from [place] for evaluation of seizure-like activities.    Collateral history collected from celso Almanza at bedside and group home nurse Karen over the phone. Patient has known epilepsy with focal to bilateral tonic clonic seizures. However, in the past few weeks, has been having increased episodes of very brief (<30s) whole body stiffening and upward eye rolling, now up to multiple times daily. Home ASM: divalproex 600mg TID (level 61.8 on admission), clonazepam 0.5mg TID. Karen states that patient was on clobazam prn, but never as a maintenance med. At [place], levetiracetam 500mg BID added. Transferred to [facility] for cvEEG and further evaluation of these new seizure-like events. Connected to EEG and confirmed that new events are broad onset seizures, aborted with levetiracetam and valproic acid loading.     Home Antiseizure Medication and Device  divalproex 600mg TID  clonazepam 0.5mg TID    Interpretation:    Start Date: 2023 – Day 1                                Start Time – 19:20       Duration – 10h 40m    Daily EEG Visual Analysis  Findings: The background was continuous and reactive. During wakefulness, the posterior dominant rhythm consisted of symmetric, well-modulated 10 Hz activity, with amplitude to 30 uV, that attenuated to eye opening.     Background Slowing:  No generalized background slowing was present.    Focal Slowing:   None were present.    Sleep Background:  Drowsiness was characterized by fragmentation, attenuation, and slowing of the background activity.    Sleep was characterized by the presence of vertex waves, symmetric sleep spindles and K-complexes.    Other Non-Epileptiform Findings:  Diffuse excess beta activity.    Interictal Epileptiform Activity:   During sleep, frequent to abundant, broad (midline max) spikes with shifting maximum in the left or right hemispheres, at times appearing as generalized periodic discharges (GPD).    Events:  No events or seizures recorded.    Artifacts:  Intermittent myogenic and movement artifacts were noted.    ECG:  The heart rate on single channel ECG was predominantly between 80-90 BPM.    ASM:  LEV 1500mg BID, VPA DR 1000mg BID    EEG Summary:  Abnormal EEG in the awake, drowsy and asleep states.  - During sleep, frequent to abundant, broad (midline max) spikes with shifting maximum in the left or right hemispheres, at times appearing as generalized periodic discharges (GPD).  - Diffuse excess beta activity.    Impression/Clinical Correlate:  No events or seizures were recorded. Interictal findings suggest focal epilepsy with rapid bilateral synchrony. Excess diffuse beta activity may be seen with medication use such as benzodiazepines or barbiturates.    ________________________________________    Mica Grant MD  Director, Epilepsy/EMU - [facility]    [facility]  Comprehensive Epilepsy Center  Epilepsy Monitoring Unit Report    [facility]: [address], [place] [address], Phone [phone]  [place] Office: [address], [address], [place] [address] Phone [phone]    [facility]: [address] [place] [address], Phone [phone]  [place] Office: [address] [place] [address], Phone [phone]    Patient Name: Angel Urena    Age: 22 year, : 2000  Patient ID: -, MRN #: -, Mora: -    Physician Ordering Inpatient EEG: Guille Membreno  Referral Source to EMU: non-elective admission – [facility]    EMU Study Started: 19:20 on 23    EMU Study Ended: 17:21 on 23    Study Information:    EEG Recording Technique:  The patient underwent continuous Video-EEG monitoring, using Telemetry System hardware on the XLTek Digital System. EEG and video data were stored on a computer hard drive with important events saved in digital archive files. The material was reviewed by a physician (electroencephalographer / epileptologist) on a daily basis. Rafita and seizure detection algorithms were utilized and reviewed. An EEG Technician attended to the patient, and was available throughout daytime work hours.  The epilepsy center neurologist was available in person or on call 24-hours per day.    EEG Placement and Labeling of Electrodes:  The EEG was performed utilizing 20 channel referential EEG connections (coronal over temporal over parasagittal montage) using all standard 10-20 electrode placements with EKG, with additional electrodes placed in the inferior temporal region using the modified 10-10 montage electrode placements for elective admissions, or if deemed necessary. Recording was at a sampling rate of 256 samples per second per channel. Time synchronized digital video recording was done simultaneously with EEG recording. A low light infrared camera was used for low light recording.               History:  This is a 23yo male with a history of severe autism, nonverbal and epilepsy who was transferred from [place] for evaluation of seizure-like activities.    Collateral history collected from celso Almanza at bedside and group home nurse Karen over the phone. Patient has known epilepsy with focal to bilateral tonic clonic seizures. However, in the past few weeks, has been having increased episodes of very brief (<30s) whole body stiffening and upward eye rolling, now up to multiple times daily. Home ASM: divalproex 600mg TID (level 61.8 on admission), clonazepam 0.5mg TID. Karen states that patient was on clobazam prn, but never as a maintenance med. At [place], levetiracetam 500mg BID added. Transferred to [facility] for cvEEG and further evaluation of these new seizure-like events. Connected to EEG and confirmed that new events are broad onset seizures, aborted with levetiracetam and valproic acid loading.     Home Antiseizure Medication and Device  divalproex 600mg TID  clonazepam 0.5mg TID    Interpretation:    Start Date: 2023 – Day 1                                Start Time – 19:20       Duration – 10h 40m    Daily EEG Visual Analysis  Findings: The background was continuous and reactive. During wakefulness, the posterior dominant rhythm consisted of symmetric, well-modulated 10 Hz activity, with amplitude to 30 uV, that attenuated to eye opening.     Background Slowing:  No generalized background slowing was present.    Focal Slowing:   None were present.    Sleep Background:  Drowsiness was characterized by fragmentation, attenuation, and slowing of the background activity.    Sleep was characterized by the presence of vertex waves, symmetric sleep spindles and K-complexes.    Other Non-Epileptiform Findings:  Diffuse excess beta activity.    Interictal Epileptiform Activity:   During sleep, frequent to abundant, broad (midline max) spikes with shifting maximum in the left or right hemispheres, at times appearing as generalized periodic discharges (GPD).    Events:  No events or seizures recorded.    Artifacts:  Intermittent myogenic and movement artifacts were noted.    ECG:  The heart rate on single channel ECG was predominantly between 80-90 BPM.    ASM:  LEV 1500mg BID, VPA DR 1000mg BID    Start Date: 2023 – Day 2                                       Start Time – 08:00      Duration – 8h 8m    Daily EEG Visual Analysis  FINDINGS:  The background, artifacts and HR on single channel ECG were similar as previous day.    Interictal Epileptiform Activity:   During sleep, frequent to abundant, broad (midline max) spikes with shifting maximum in the left or right hemispheres, at times appearing as generalized periodic discharges (GPD).    Events:  No events or seizures recorded.    ASM:  LEV 1500mg BID, VPA DR 1000mg BID    EEG Summary:  Abnormal EEG in the awake, drowsy and asleep states.  - During sleep, frequent to abundant, broad (midline max) spikes with shifting maximum in the left or right hemispheres, at times appearing as generalized periodic discharges (GPD).  - Diffuse excess beta activity.    Impression/Clinical Correlate:  No events or seizures were recorded. Interictal findings suggest focal epilepsy with rapid bilateral synchrony. Excess diffuse beta activity may be seen with medication use such as benzodiazepines or barbiturates.    Antiseizure medication at discharge:  Start LEV 1500mg TID  Increase divalproex 600mg TID to 1500/1000mg     ________________________________________    Mica Grant MD  Director, Epilepsy/EMU - [facility]

## 2023-01-20 NOTE — DISCHARGE NOTE NURSING/CASE MANAGEMENT/SOCIAL WORK - NSDCPEFALRISK_GEN_ALL_CORE
For information on Fall & Injury Prevention, visit: [email or web address] OR  [email or web address] OR  https://www.cdc.gov/steadi/patient.html

## 2023-01-20 NOTE — PROGRESS NOTE ADULT - ASSESSMENT
22M with PMHX Severe Autism (Nonverbal baseline), Seizure Disorder BIBEMS for breakthrough seizure to [facility] and transferred to [facility] ER for recurrent breakthrough seizure.    #Recurrent Breakthrough Seizures  - neurochecks  - epilepsy recs appreciated  - C/w keppra, valproate, and clonazepam  - ativan prn   - cveeg    #severe autism  - nonverbal at baseline  - supportive care     #DVT Prophylaxis  - lovenox SC    plan of care d/w patient group home staff bedside. Pending GH.    Updated patient's father, Luis Fernando ([phone]) on 1/20.

## 2023-01-20 NOTE — PROGRESS NOTE ADULT - ASSESSMENT
This is a 23yo male with a history of severe autism, nonverbal and epilepsy who was transferred from [place] for evaluation of seizure-like activities. Personally reviewed all imagines, labs, EEG and other studies.    Impression:  Events of interest captured and consistent with seizures with both focal and generalized features.    Recommendation:  - continue levetiracetam 1500mg BID   - continue divalproex sodium DR 1000mg BID  - continue clonazepam 0.5mg TID  - lorazepam 2mg IV prn convulsive seizure    - pending discharge back to group today      No acute intervention from Epilepsy at this time.  Please reconsult if needed.   ______________________  Mica Grant MD   Director, Epilepsy/EMU - [facility]   Epilepsy Consult #: [phone] ([phone])

## 2023-01-21 LAB — VALPROATE FREE SERPL-MCNC: 9.7 MG/L — SIGNIFICANT CHANGE UP (ref 4.8–17.3)

## 2023-01-30 PROBLEM — Z00.00 ENCOUNTER FOR PREVENTIVE HEALTH EXAMINATION: Status: ACTIVE | Noted: 2023-01-30

## 2023-04-23 ENCOUNTER — NON-APPOINTMENT (OUTPATIENT)
Age: 23
End: 2023-04-23

## 2023-08-23 ENCOUNTER — EMERGENCY (EMERGENCY)
Facility: HOSPITAL | Age: 23
LOS: 1 days | Discharge: NURSING, REHAB OR LONG-TERM CARE | End: 2023-08-23
Attending: EMERGENCY MEDICINE | Admitting: EMERGENCY MEDICINE
Payer: COMMERCIAL

## 2023-08-23 VITALS
RESPIRATION RATE: 18 BRPM | TEMPERATURE: 98 F | DIASTOLIC BLOOD PRESSURE: 63 MMHG | SYSTOLIC BLOOD PRESSURE: 119 MMHG | HEART RATE: 80 BPM | OXYGEN SATURATION: 96 %

## 2023-08-23 VITALS
DIASTOLIC BLOOD PRESSURE: 85 MMHG | WEIGHT: 242.95 LBS | OXYGEN SATURATION: 96 % | SYSTOLIC BLOOD PRESSURE: 133 MMHG | HEIGHT: 72 IN | HEART RATE: 92 BPM | RESPIRATION RATE: 18 BRPM

## 2023-08-23 LAB
ALBUMIN SERPL ELPH-MCNC: 3.3 G/DL — SIGNIFICANT CHANGE UP (ref 3.3–5)
ALP SERPL-CCNC: 79 U/L — SIGNIFICANT CHANGE UP (ref 30–120)
ALT FLD-CCNC: 106 U/L — HIGH (ref 10–60)
ANION GAP SERPL CALC-SCNC: 9 MMOL/L — SIGNIFICANT CHANGE UP (ref 5–17)
AST SERPL-CCNC: 103 U/L — HIGH (ref 10–40)
BASOPHILS # BLD AUTO: 0.03 K/UL — SIGNIFICANT CHANGE UP (ref 0–0.2)
BASOPHILS NFR BLD AUTO: 0.4 % — SIGNIFICANT CHANGE UP (ref 0–2)
BILIRUB SERPL-MCNC: 0.3 MG/DL — SIGNIFICANT CHANGE UP (ref 0.2–1.2)
BUN SERPL-MCNC: 17 MG/DL — SIGNIFICANT CHANGE UP (ref 7–23)
CALCIUM SERPL-MCNC: 9.8 MG/DL — SIGNIFICANT CHANGE UP (ref 8.4–10.5)
CHLORIDE SERPL-SCNC: 103 MMOL/L — SIGNIFICANT CHANGE UP (ref 96–108)
CO2 SERPL-SCNC: 28 MMOL/L — SIGNIFICANT CHANGE UP (ref 22–31)
CREAT SERPL-MCNC: 0.91 MG/DL — SIGNIFICANT CHANGE UP (ref 0.5–1.3)
EGFR: 123 ML/MIN/1.73M2 — SIGNIFICANT CHANGE UP
EOSINOPHIL # BLD AUTO: 0.1 K/UL — SIGNIFICANT CHANGE UP (ref 0–0.5)
EOSINOPHIL NFR BLD AUTO: 1.3 % — SIGNIFICANT CHANGE UP (ref 0–6)
GLUCOSE SERPL-MCNC: 175 MG/DL — HIGH (ref 70–99)
HCT VFR BLD CALC: 43.3 % — SIGNIFICANT CHANGE UP (ref 39–50)
HGB BLD-MCNC: 13.1 G/DL — SIGNIFICANT CHANGE UP (ref 13–17)
IMM GRANULOCYTES NFR BLD AUTO: 0.4 % — SIGNIFICANT CHANGE UP (ref 0–0.9)
LYMPHOCYTES # BLD AUTO: 2.84 K/UL — SIGNIFICANT CHANGE UP (ref 1–3.3)
LYMPHOCYTES # BLD AUTO: 36.9 % — SIGNIFICANT CHANGE UP (ref 13–44)
MAGNESIUM SERPL-MCNC: 1.7 MG/DL — SIGNIFICANT CHANGE UP (ref 1.6–2.6)
MCHC RBC-ENTMCNC: 24.5 PG — LOW (ref 27–34)
MCHC RBC-ENTMCNC: 30.3 GM/DL — LOW (ref 32–36)
MCV RBC AUTO: 80.9 FL — SIGNIFICANT CHANGE UP (ref 80–100)
MONOCYTES # BLD AUTO: 1 K/UL — HIGH (ref 0–0.9)
MONOCYTES NFR BLD AUTO: 13 % — SIGNIFICANT CHANGE UP (ref 2–14)
NEUTROPHILS # BLD AUTO: 3.7 K/UL — SIGNIFICANT CHANGE UP (ref 1.8–7.4)
NEUTROPHILS NFR BLD AUTO: 48 % — SIGNIFICANT CHANGE UP (ref 43–77)
NRBC # BLD: 0 /100 WBCS — SIGNIFICANT CHANGE UP (ref 0–0)
PLATELET # BLD AUTO: 297 K/UL — SIGNIFICANT CHANGE UP (ref 150–400)
POTASSIUM SERPL-MCNC: 4.4 MMOL/L — SIGNIFICANT CHANGE UP (ref 3.5–5.3)
POTASSIUM SERPL-SCNC: 4.4 MMOL/L — SIGNIFICANT CHANGE UP (ref 3.5–5.3)
PROT SERPL-MCNC: 7.8 G/DL — SIGNIFICANT CHANGE UP (ref 6–8.3)
RBC # BLD: 5.35 M/UL — SIGNIFICANT CHANGE UP (ref 4.2–5.8)
RBC # FLD: 15.4 % — HIGH (ref 10.3–14.5)
SARS-COV-2 RNA SPEC QL NAA+PROBE: SIGNIFICANT CHANGE UP
SODIUM SERPL-SCNC: 140 MMOL/L — SIGNIFICANT CHANGE UP (ref 135–145)
VALPROATE SERPL-MCNC: 108 UG/ML — HIGH (ref 50–100)
WBC # BLD: 7.7 K/UL — SIGNIFICANT CHANGE UP (ref 3.8–10.5)
WBC # FLD AUTO: 7.7 K/UL — SIGNIFICANT CHANGE UP (ref 3.8–10.5)

## 2023-08-23 PROCEDURE — 85025 COMPLETE CBC W/AUTO DIFF WBC: CPT

## 2023-08-23 PROCEDURE — 83735 ASSAY OF MAGNESIUM: CPT

## 2023-08-23 PROCEDURE — 80177 DRUG SCRN QUAN LEVETIRACETAM: CPT

## 2023-08-23 PROCEDURE — 87635 SARS-COV-2 COVID-19 AMP PRB: CPT

## 2023-08-23 PROCEDURE — 93005 ELECTROCARDIOGRAM TRACING: CPT

## 2023-08-23 PROCEDURE — 36415 COLL VENOUS BLD VENIPUNCTURE: CPT

## 2023-08-23 PROCEDURE — 80164 ASSAY DIPROPYLACETIC ACD TOT: CPT

## 2023-08-23 PROCEDURE — 99285 EMERGENCY DEPT VISIT HI MDM: CPT

## 2023-08-23 PROCEDURE — 93010 ELECTROCARDIOGRAM REPORT: CPT

## 2023-08-23 PROCEDURE — 80053 COMPREHEN METABOLIC PANEL: CPT

## 2023-08-23 PROCEDURE — 99284 EMERGENCY DEPT VISIT MOD MDM: CPT

## 2023-08-23 RX ORDER — SODIUM CHLORIDE 9 MG/ML
1000 INJECTION INTRAMUSCULAR; INTRAVENOUS; SUBCUTANEOUS ONCE
Refills: 0 | Status: COMPLETED | OUTPATIENT
Start: 2023-08-23 | End: 2023-08-23

## 2023-08-23 RX ORDER — LEVETIRACETAM 250 MG/1
300 TABLET, FILM COATED ORAL ONCE
Refills: 0 | Status: COMPLETED | OUTPATIENT
Start: 2023-08-23 | End: 2023-08-23

## 2023-08-23 RX ADMIN — LEVETIRACETAM 300 MILLIGRAM(S): 250 TABLET, FILM COATED ORAL at 23:00

## 2023-08-23 RX ADMIN — SODIUM CHLORIDE 2000 MILLILITER(S): 9 INJECTION INTRAMUSCULAR; INTRAVENOUS; SUBCUTANEOUS at 21:37

## 2023-08-23 NOTE — ED PROVIDER NOTE - OBJECTIVE STATEMENT
23 M hx seizures (valproic acid, keppra), autism, sent from group home after having 3 seizures in short period of time. EMS states staff reported "small" seizures. Was given voltoco (intranasal diazepam) 2020. No seizure activity reported by EMS. Pt currently at his baseline.

## 2023-08-23 NOTE — ED PROVIDER NOTE - CLINICAL SUMMARY MEDICAL DECISION MAKING FREE TEXT BOX
attg note: 23 y.o. M with h/o seizures, here for breakthrough, pt is nonverbal (right now at baseline per aide at bedside), per aide (over phone from group home) a few days ago pt had a "big" seizure at day group, then every day has had a little seizure, lasting seconds. this evening pt had 3 small seizures in a row (the longest lasting 20sec) with only a second or 2 between, he was given his [place] valium and had 4th brief seizure just after, EMS was called for transfer, his IN valium is reportedly rxd for prolonged/clustered breakthrough seizures. pt was BIBEMS and has been baseline since; on exam pt is wd, wn, nad, watching cell phone; heent - perrl, mmm, nc/at; msk - moving all extremities spontanously/equally; neuro - alert, exam limited by basline neuro diagnosis, is baseline per aide and is interacting at baseline; MDM breakthrough seizures, in [facility] appears to have had breakthroughs about 8 months ago, since then reportedly 1/week-month, this is an increase, no clear indication why, will check basic labs, d/w neuro

## 2023-08-23 NOTE — ED PROVIDER NOTE - PATIENT PORTAL LINK FT
You can access the FollowMyHealth Patient Portal offered by [facility] by registering at the following website: [email or web address]. By joining [employer]’s FollowMyHealth portal, you will also be able to view your health information using other applications (apps) compatible with our system.

## 2023-08-23 NOTE — ED PROVIDER NOTE - PROGRESS NOTE DETAILS
left message for patient's outpt neurologist, Dr. Tubbs, for call back; need to discuss whether pt requires dose adjustment, admission or dc with follow up spoke with pt's private neurologist, Dr. Tubbs, pt has not had any further seizure activity since presentation, discussed vpa level, keppra level will return tomorrow, basic labs without significant findings, slight elevation lfts, Dr. Tubbs advises changing VPA dosing from 30mL BID to 20mL TID and changing keppra dosing to 15mL/15mL and 18mL for night dose. Tommorrow group home to call to arrange outpatient f/u. Have spoken to aide with pt and an aide at the group home, they state the information and discharge documents will be given to the nurse in the morning for the adjustments

## 2023-08-23 NOTE — ED PROVIDER NOTE - NSFOLLOWUPINSTRUCTIONS_ED_ALL_ED_FT
WE SPOKE WITH DR. GILBERT - PATIENT'S NEUROLOGIST - HE ADVISES THE FOLLOWING CHANGES TO MEDICATION REGIMEN.    CHANGE VALPROIC ACID LIQUID DOSING TO 250mg/5mL 20ML three times a day (was 30mL twice a day)    CHANGE LEVETIRACETAM 100mg/mL to 15mL morning, 15mL afternoon and 18mL evening (was 15mL three times a day)    PLEASE CALL TO MAKE FOLLOW UP APPOINTMENT WITH DR. GILBERT        Seizure, Adult  A seizure is a sudden burst of abnormal electrical and chemical activity in the brain. Seizures usually last from 30 seconds to 2 minutes. The abnormal activity temporarily interrupts normal brain function.    Many types of seizures can affect adults. A seizure can cause many different symptoms depending on where in the brain it starts.    What are the causes?  Common causes of this condition include:  Fever or infection.  Brain injury, head trauma, bleeding in the brain, or a brain tumor.  Low levels of blood sugar or salt (sodium).  Kidney problems or liver problems.  Metabolic disorders or other conditions that are passed from parent to child (are inherited).  Reaction to a substance, such as a drug or a medicine, or suddenly stopping the use of a substance (withdrawal).  A stroke.  Developmental disorders such as autism spectrum disorder or cerebral palsy.  In some cases, the cause of a seizure may not be known. Some people who have a seizure never have another one. A person who has repeated seizures over time without a clear cause has a condition called epilepsy.    What increases the risk?  You are more likely to develop this condition if:  You have a family history of epilepsy.  You have had a tonic–clonic seizure before. This type of seizure causes tightening (contraction) of the muscles of the whole body and loss of consciousness.  You have a history of head trauma, lack of oxygen at birth, or strokes.  What are the signs or symptoms?  There are many different types of seizures. The symptoms vary depending on the type of seizure you have. Symptoms occur during the seizure. They may also occur before a seizure (aura) and after a seizure (postictal). Symptoms may include the following:    Symptoms during a seizure    Uncontrollable shaking (convulsions) with fast, jerky movements of muscles.  Stiffening of the body.  Breathing problems.  Confusion, staring, or unresponsiveness.  Head nodding, eye blinking or fluttering, or rapid eye movements.  Drooling, grunting, or making clicking sounds with your mouth.  Loss of bladder control and bowel control.  Symptoms before a seizure    Fear or anxiety.  Nausea.  Vertigo. This is a feeling like:  You are moving when you are not.  Your surroundings are moving when they are not.  Déjà vu. This is a feeling of having seen or heard something before.  Odd tastes or smells.  Changes in vision, such as seeing flashing lights or spots.  Symptoms after a seizure    Confusion.  Sleepiness.  Headache.  Sore muscles.  How is this diagnosed?  This condition may be diagnosed based on:  A description of your symptoms. Video of your seizures can be helpful.  Your medical history.  A physical exam.  You may also have tests, including:  Blood tests.  CT scan.  MRI.  Electroencephalogram (EEG). This test measures electrical activity in the brain. An EEG can predict whether seizures will return.  A spinal tap, also called a lumbar puncture. This is the removal and testing of fluid that surrounds the brain and spinal cord.  How is this treated?  Most seizures will stop on their own in less than 5 minutes, and no treatment is needed. Seizures that last longer than 5 minutes will usually need treatment.    Seizures may be treated with:  Medicines given through an IV.  Avoiding known triggers, such as medicines that you take for another condition.  Medicines to control seizures or prevent future seizures (antiepileptics), if epilepsy caused your seizures.  Medical devices to prevent and control seizures.  Surgery to stop seizures or to reduce how often seizures happen, if you have epilepsy that does not respond to medicines.  A diet low in carbohydrates and high in fat (ketogenic diet).  Follow these instructions at home:  Medicines    Take over-the-counter and prescription medicines only as told by your health care provider.  Avoid any substances that may prevent your medicine from working properly, such as alcohol.  Activity    Follow instructions about activities, such as driving or swimming, that would be dangerous if you had another seizure. Wait until your health care provider says it is safe to do them.  If you live in the [place] check with your local department of motor vehicles (DMV) to find out about local driving laws. Each state has specific rules about when you can legally drive again.  Get enough rest. Lack of sleep can make seizures more likely to occur.  Educating others      Teach friends and family what to do if you have a seizure. They should:  Help you get down to the ground, to prevent a fall.  Cushion your head and move items away from your body.  Loosen any tight clothing around your neck.  Turn you on your side. If you vomit, this helps keep your airway clear.  Know whether or not you need emergency care.  Stay with you until you recover.  Also, tell them what not to do if you have a seizure. Tell them:  They should not hold you down. Holding you down will not stop the seizure.  They should not put anything in your mouth.  General instructions    Avoid anything that has ever triggered a seizure for you.  Keep a seizure diary. Record what you remember about each seizure, especially anything that might have triggered it.  Keep all follow-up visits. This is important.  Contact a health care provider if:  You have another seizure or seizures. Call each time you have a seizure.  Your seizure pattern changes.  You continue to have seizures with treatment.  You have symptoms of an infection or illness. Either of these might increase your risk of having a seizure.  You are unable to take your medicine.  Get help right away if:  You have:  A seizure that does not stop after 5 minutes.  Several seizures in a row without a complete recovery between seizures.  A seizure that makes it harder to breathe.  A seizure that leaves you unable to speak or use a part of your body.  You do not wake up right away after a seizure.  You injure yourself during a seizure.  You have confusion or pain right after a seizure.  These symptoms may represent a serious problem that is an emergency. Do not wait to see if the symptoms will go away. Get medical help right away. Call your local emergency services (911 in the [place] Do not drive yourself to the hospital.    Summary  Seizures are caused by abnormal electrical and chemical activity in the brain. The activity disrupts normal brain function and can cause various symptoms.  Seizures have many causes, including illness, head injuries, low levels of blood sugar or salt, and certain conditions.  Most seizures will stop on their own in less than 5 minutes. Seizures that last longer than 5 minutes are a medical emergency and need treatment right away.  Many medicines are used to treat seizures. Take over-the-counter and prescription medicines only as told by your health care provider.  This information is not intended to replace advice given to you by your health care provider. Make sure you discuss any questions you have with your health care provider.

## 2023-08-23 NOTE — ED PROVIDER NOTE - CARE PROVIDER_API CALL
Bradley Tubbs  Child Neurology  [address] [place] [address], [address]  [place] [address]  Phone: [phone]  Fax: [phone]  Follow Up Time: 1-3 Days

## 2023-08-23 NOTE — ED ADULT NURSE NOTE - OBJECTIVE STATEMENT
pt w/ hx of seziures had sezsiure today, nonverbal at baseline. pt is back to baseline as per group home staff at bedside. no obvious signs of injury

## 2023-08-24 PROBLEM — G40.909 EPILEPSY, UNSPECIFIED, NOT INTRACTABLE, WITHOUT STATUS EPILEPTICUS: Chronic | Status: ACTIVE | Noted: 2023-01-18

## 2023-08-24 PROBLEM — F84.0 AUTISTIC DISORDER: Chronic | Status: ACTIVE | Noted: 2023-01-18

## 2023-08-26 LAB — LEVETIRACETAM SERPL-MCNC: 55.1 UG/ML — HIGH (ref 10–40)

## 2023-09-01 ENCOUNTER — EMERGENCY (EMERGENCY)
Facility: HOSPITAL | Age: 23
LOS: 1 days | Discharge: HOME | End: 2023-09-01
Attending: EMERGENCY MEDICINE | Admitting: EMERGENCY MEDICINE
Payer: COMMERCIAL

## 2023-09-01 VITALS
TEMPERATURE: 99 F | WEIGHT: 220.02 LBS | RESPIRATION RATE: 18 BRPM | HEIGHT: 73 IN | DIASTOLIC BLOOD PRESSURE: 74 MMHG | SYSTOLIC BLOOD PRESSURE: 135 MMHG | OXYGEN SATURATION: 96 % | HEART RATE: 100 BPM

## 2023-09-01 VITALS
TEMPERATURE: 99 F | RESPIRATION RATE: 19 BRPM | DIASTOLIC BLOOD PRESSURE: 55 MMHG | HEART RATE: 72 BPM | SYSTOLIC BLOOD PRESSURE: 115 MMHG | OXYGEN SATURATION: 95 %

## 2023-09-01 LAB
ALBUMIN SERPL ELPH-MCNC: 3.5 G/DL — SIGNIFICANT CHANGE UP (ref 3.3–5)
ALP SERPL-CCNC: 84 U/L — SIGNIFICANT CHANGE UP (ref 30–120)
ALT FLD-CCNC: 150 U/L — HIGH (ref 10–60)
ANION GAP SERPL CALC-SCNC: 11 MMOL/L — SIGNIFICANT CHANGE UP (ref 5–17)
AST SERPL-CCNC: 127 U/L — HIGH (ref 10–40)
BASOPHILS # BLD AUTO: 0.04 K/UL — SIGNIFICANT CHANGE UP (ref 0–0.2)
BASOPHILS NFR BLD AUTO: 0.5 % — SIGNIFICANT CHANGE UP (ref 0–2)
BILIRUB SERPL-MCNC: 0.2 MG/DL — SIGNIFICANT CHANGE UP (ref 0.2–1.2)
BUN SERPL-MCNC: 14 MG/DL — SIGNIFICANT CHANGE UP (ref 7–23)
CALCIUM SERPL-MCNC: 10 MG/DL — SIGNIFICANT CHANGE UP (ref 8.4–10.5)
CHLORIDE SERPL-SCNC: 102 MMOL/L — SIGNIFICANT CHANGE UP (ref 96–108)
CO2 SERPL-SCNC: 26 MMOL/L — SIGNIFICANT CHANGE UP (ref 22–31)
CREAT SERPL-MCNC: 0.79 MG/DL — SIGNIFICANT CHANGE UP (ref 0.5–1.3)
EGFR: 130 ML/MIN/1.73M2 — SIGNIFICANT CHANGE UP
EOSINOPHIL # BLD AUTO: 0.07 K/UL — SIGNIFICANT CHANGE UP (ref 0–0.5)
EOSINOPHIL NFR BLD AUTO: 0.9 % — SIGNIFICANT CHANGE UP (ref 0–6)
GLUCOSE SERPL-MCNC: 78 MG/DL — SIGNIFICANT CHANGE UP (ref 70–99)
HCT VFR BLD CALC: 43.9 % — SIGNIFICANT CHANGE UP (ref 39–50)
HGB BLD-MCNC: 13.7 G/DL — SIGNIFICANT CHANGE UP (ref 13–17)
IMM GRANULOCYTES NFR BLD AUTO: 0.6 % — SIGNIFICANT CHANGE UP (ref 0–0.9)
LYMPHOCYTES # BLD AUTO: 2.51 K/UL — SIGNIFICANT CHANGE UP (ref 1–3.3)
LYMPHOCYTES # BLD AUTO: 32.3 % — SIGNIFICANT CHANGE UP (ref 13–44)
MAGNESIUM SERPL-MCNC: 1.6 MG/DL — SIGNIFICANT CHANGE UP (ref 1.6–2.6)
MCHC RBC-ENTMCNC: 24.9 PG — LOW (ref 27–34)
MCHC RBC-ENTMCNC: 31.2 GM/DL — LOW (ref 32–36)
MCV RBC AUTO: 79.8 FL — LOW (ref 80–100)
MONOCYTES # BLD AUTO: 0.89 K/UL — SIGNIFICANT CHANGE UP (ref 0–0.9)
MONOCYTES NFR BLD AUTO: 11.4 % — SIGNIFICANT CHANGE UP (ref 2–14)
NEUTROPHILS # BLD AUTO: 4.22 K/UL — SIGNIFICANT CHANGE UP (ref 1.8–7.4)
NEUTROPHILS NFR BLD AUTO: 54.3 % — SIGNIFICANT CHANGE UP (ref 43–77)
NRBC # BLD: 0 /100 WBCS — SIGNIFICANT CHANGE UP (ref 0–0)
PLATELET # BLD AUTO: 317 K/UL — SIGNIFICANT CHANGE UP (ref 150–400)
POTASSIUM SERPL-MCNC: 3.8 MMOL/L — SIGNIFICANT CHANGE UP (ref 3.5–5.3)
POTASSIUM SERPL-SCNC: 3.8 MMOL/L — SIGNIFICANT CHANGE UP (ref 3.5–5.3)
PROT SERPL-MCNC: 8.2 G/DL — SIGNIFICANT CHANGE UP (ref 6–8.3)
RBC # BLD: 5.5 M/UL — SIGNIFICANT CHANGE UP (ref 4.2–5.8)
RBC # FLD: 15.4 % — HIGH (ref 10.3–14.5)
SODIUM SERPL-SCNC: 139 MMOL/L — SIGNIFICANT CHANGE UP (ref 135–145)
VALPROATE SERPL-MCNC: 118 UG/ML — HIGH (ref 50–100)
WBC # BLD: 7.78 K/UL — SIGNIFICANT CHANGE UP (ref 3.8–10.5)
WBC # FLD AUTO: 7.78 K/UL — SIGNIFICANT CHANGE UP (ref 3.8–10.5)

## 2023-09-01 PROCEDURE — 99285 EMERGENCY DEPT VISIT HI MDM: CPT

## 2023-09-01 PROCEDURE — 70450 CT HEAD/BRAIN W/O DYE: CPT | Mod: MA

## 2023-09-01 PROCEDURE — 85025 COMPLETE CBC W/AUTO DIFF WBC: CPT

## 2023-09-01 PROCEDURE — 93005 ELECTROCARDIOGRAM TRACING: CPT

## 2023-09-01 PROCEDURE — 71045 X-RAY EXAM CHEST 1 VIEW: CPT

## 2023-09-01 PROCEDURE — 83735 ASSAY OF MAGNESIUM: CPT

## 2023-09-01 PROCEDURE — 80177 DRUG SCRN QUAN LEVETIRACETAM: CPT

## 2023-09-01 PROCEDURE — 93010 ELECTROCARDIOGRAM REPORT: CPT

## 2023-09-01 PROCEDURE — 71045 X-RAY EXAM CHEST 1 VIEW: CPT | Mod: 26

## 2023-09-01 PROCEDURE — 70450 CT HEAD/BRAIN W/O DYE: CPT | Mod: 26,MA

## 2023-09-01 PROCEDURE — 99285 EMERGENCY DEPT VISIT HI MDM: CPT | Mod: 25

## 2023-09-01 PROCEDURE — 36415 COLL VENOUS BLD VENIPUNCTURE: CPT

## 2023-09-01 PROCEDURE — 80053 COMPREHEN METABOLIC PANEL: CPT

## 2023-09-01 PROCEDURE — 80164 ASSAY DIPROPYLACETIC ACD TOT: CPT

## 2023-09-01 RX ORDER — CLONAZEPAM 1 MG
2 TABLET ORAL ONCE
Refills: 0 | Status: DISCONTINUED | OUTPATIENT
Start: 2023-09-01 | End: 2023-09-01

## 2023-09-01 RX ORDER — SODIUM CHLORIDE 9 MG/ML
1000 INJECTION INTRAMUSCULAR; INTRAVENOUS; SUBCUTANEOUS ONCE
Refills: 0 | Status: COMPLETED | OUTPATIENT
Start: 2023-09-01 | End: 2023-09-01

## 2023-09-01 RX ADMIN — SODIUM CHLORIDE 1000 MILLILITER(S): 9 INJECTION INTRAMUSCULAR; INTRAVENOUS; SUBCUTANEOUS at 14:03

## 2023-09-01 RX ADMIN — Medication 2 MILLIGRAM(S): at 16:54

## 2023-09-01 NOTE — ED PROVIDER NOTE - CLINICAL SUMMARY MEDICAL DECISION MAKING FREE TEXT BOX
Patient brought in by EMS from group [facility] for multiple episodes of seizures.  Per EMS report patient was given intranasal Valium 20 mg.  Still seizing upon their arrival was given Versed 5 mg IM by EMS and seizure stopped.  EMS relates patient does have a history of seizures and is on seizure medication, but they are not sure which ones as paperwork was not provided by [facility].  Per EMS report patient at his baseline at this time.  Patient nonverbal unable to provide history.  No further history available.    Plan EKG labs CT head

## 2023-09-01 NOTE — ED ADULT NURSE NOTE - OBJECTIVE STATEMENT
Received pt in assigned area, sent from [facility]  with staff s/p witnessed seizure x2 episodes. Pt is baseline awake, alert non verbal, staff witnessed pt having a seizure x2 , unsure if pt hit his head, pt was given 20mg of Valium I.N. via staff at facility  & 5mg Versed via [facility], as per staff member, pt is back to his baseline at this time, pt is opening eyes and able to follow basic commands, no signs of trauma noted on pt, pt has a known h/o seizures, last seizure a few weeks ago, skin intact, resps even and non labored, IVL placed labs drawn and sent, pt placed on cardiac monitor, bed in lowest position, siderails up, staff remains with patient, will continue to monitor pt

## 2023-09-01 NOTE — ED ADULT NURSE REASSESSMENT NOTE - NS ED NURSE REASSESS COMMENT FT1
resting in nad, no additional episodes of seizure activity noted in ed ,  staff from facility remains with pt, will continue to monitor

## 2023-09-01 NOTE — ED PROVIDER NOTE - PROGRESS NOTE DETAILS
Reevaluated patient at bedside.  Discussed the results of all diagnostic testing in ED and copies of all reports given to group home staff. Pt in NAD in ED, no s/s seizures and in NAD.   An opportunity to ask questions was given.  Discussed the importance of prompt, close medical follow-up.  Patient will return with any changes, concerns or persistent / worsening symptoms.  Understanding of all instructions verbalized. patient seen by neurologist, Dr. Constantino in ED, discussed case and results, advised to give one dose of klonopin 2mg po one dose and d/c home, can continue same home meds.

## 2023-09-01 NOTE — ED ADULT TRIAGE NOTE - CHIEF COMPLAINT QUOTE
Sent from group Home, multiple episodes of seizure as per EMS, pt was given Valium 20 mg IN by group home staff and Versed 5 mg IM by EMS, now back to normal, non verbal as baseline

## 2023-09-01 NOTE — CONSULT NOTE ADULT - SUBJECTIVE AND OBJECTIVE BOX
info staff h/o of sz last sz last week now as per staff 5 minutes off and on sz in regards to whole body arms legs shaking received 20 valium and 5 versed with no postictal lethargy -- palmira did have EEG monitoring in jan which was abnormal with seizure  event unclear any postictal state at that time  awaiting list of home meds, will check levels of AED if possible--- will adjust if abnormal--- unclear if this is behavorial would suspect with 5 minutes of GCT sz and meds he received he would be lethargic but is fully awake and interactive but did have abnormalities EEG in past -- may need transfer if possible for EEG monitoring to capture  these events

## 2023-09-01 NOTE — ED PROVIDER NOTE - PATIENT PORTAL LINK FT
You can access the FollowMyHealth Patient Portal offered by [facility] by registering at the following website: [email or web address]. By joining [facility]’s FollowMyHealth portal, you will also be able to view your health information using other applications (apps) compatible with our system.

## 2023-09-01 NOTE — ED PROVIDER NOTE - NSFOLLOWUPINSTRUCTIONS_ED_ALL_ED_FT
Follow-up with your neurologist for reevaluation, ongoing care and treatment.  Continue seizure medications as prescribed earlier.  If having worsening symptoms or other related symptoms, return to the ER immediately.    Seizure, Adult  A seizure is a sudden burst of abnormal electrical and chemical activity in the brain. Seizures usually last from 30 seconds to 2 minutes.    What are the causes?  Common causes of this condition include:  Fever or infection.  Problems that affect the brain. These may include:  A brain or head injury.  Bleeding in the brain.  A brain tumor.  Low levels of blood sugar or salt.  Kidney problems or liver problems.  Conditions that are passed from parent to child (are inherited).  Problems with a substance, such as:  Having a reaction to a drug or a medicine.  Stopping the use of a substance all of a sudden (withdrawal).  A stroke.  Disorders that affect how you develop.  Sometimes, the cause may not be known.    What increases the risk?  Having someone in your family who has epilepsy. In this condition, seizures happen again and again over time. They have no clear cause.  Having had a tonic–clonic seizure before. This type of seizure causes you to:  Tighten the muscles of the whole body.  Lose consciousness.  Having had a head injury or strokes before.  Having had a lack of oxygen at birth.  What are the signs or symptoms?  There are many types of seizures. The symptoms vary depending on the type of seizure you have.    Symptoms during a seizure    Shaking that you cannot control (convulsions) with fast, jerky movements of muscles.  Stiffness of the body.  Breathing problems.  Feeling mixed up (confused).  Staring or not responding to sound or touch.  Head nodding.  Eyes that blink, flutter, or move fast.  Drooling, grunting, or making clicking sounds with your mouth  Losing control of when you pee or poop.  Symptoms before a seizure    Feeling afraid, nervous, or worried.  Feeling like you may vomit.  Feeling like:  You are moving when you are not.  Things around you are moving when they are not.  Feeling like you saw or heard something before (déjà vu).  Odd tastes or smells.  Changes in how you see. You may see flashing lights or spots.  Symptoms after a seizure    Feeling confused.  Feeling sleepy.  Headache.  Sore muscles.  How is this treated?  If your seizure stops on its own, you will not need treatment. If your seizure lasts longer than 5 minutes, you will normally need treatment. Treatment may include:  Medicines given through an IV tube.  Avoiding things, such as medicines, that are known to cause your seizures.  Medicines to prevent seizures.  A device to prevent or control seizures.  Surgery.  A diet low in carbohydrates and high in fat (ketogenic diet).  Follow these instructions at home:  Medicines    Take over-the-counter and prescription medicines only as told by your doctor.  Avoid foods or drinks that may keep your medicine from working, such as alcohol.  Activity    Follow instructions about driving, swimming, or doing things that would be dangerous if you had another seizure. Wait until your doctor says it is safe for you to do these things.  If you live in the [place] ask your local department of [employer] vehicles when you can drive.  Get a lot of rest.  Teaching others      Teach friends and family what to do when you have a seizure. They should:  Help you get down to the ground.  Protect your head and body.  Loosen any clothing around your neck.  Turn you on your side.  Know whether or not you need emergency care.  Stay with you until you are better.  Also, tell them what not to do if you have a seizure. Tell them:  They should not hold you down.  They should not put anything in your mouth.  General instructions    Avoid anything that gives you seizures.  Keep a seizure diary. Write down:  What you remember about each seizure.  What you think caused each seizure.  Keep all follow-up visits.  Contact a doctor if:  You have another seizure or seizures. Call the doctor each time you have a seizure.  The pattern of your seizures changes.  You keep having seizures with treatment.  You have symptoms of being sick or having an infection.  You are not able to take your medicine.  Get help right away if:  You have any of these problems:  A seizure that lasts longer than 5 minutes.  Many seizures in a row and you do not feel better between seizures.  A seizure that makes it harder to breathe.  A seizure and you can no longer speak or use part of your body.  You do not wake up right after a seizure.  You get hurt during a seizure.  You feel confused or have pain right after a seizure.  These symptoms may be an emergency. Get help right away. Call your local emergency services (911 in the [place]  Do not wait to see if the symptoms will go away.  Do not drive yourself to the hospital.  Summary  A seizure is a sudden burst of abnormal electrical and chemical activity in the brain. Seizures normally last from 30 seconds to 2 minutes.  Causes of seizures include illness, injury to the head, low levels of blood sugar or salt, and certain conditions.  Most seizures will stop on their own in less than 5 minutes. Seizures that last longer than 5 minutes are a medical emergency and need treatment right away.  Many medicines are used to treat seizures. Take over-the-counter and prescription medicines only as told by your doctor.  This information is not intended to replace advice given to you by your health care provider. Make sure you discuss any questions you have with your health care provider.

## 2023-09-01 NOTE — ED ADULT NURSE NOTE - NSFALLHARMRISKINTERV_ED_ALL_ED

## 2023-09-01 NOTE — ED PROVIDER NOTE - OBJECTIVE STATEMENT
23-year-old male with history of seizures, autism, nonverbal brought in by ambulance status post seizure today.  As per staff from day program, patient had multiple episodes of seizures today.  Staff states that patient had intermittent generalized shaking for 5 minutes. No fall or trauma as per staff. Patient was given 20 Valium and 5 Versed by EMS with no postictal lethargy.  Patient had EEG monitoring in January which was abnormal with seizure event unclear any postictal state.

## 2023-09-04 LAB — LEVETIRACETAM SERPL-MCNC: 31.6 UG/ML — SIGNIFICANT CHANGE UP (ref 10–40)

## 2023-09-06 ENCOUNTER — TRANSCRIPTION ENCOUNTER (OUTPATIENT)
Age: 23
End: 2023-09-06

## 2023-09-18 ENCOUNTER — APPOINTMENT (OUTPATIENT)
Dept: NEUROLOGY | Facility: CLINIC | Age: 23
End: 2023-09-18

## 2024-04-04 NOTE — ED ADULT NURSE NOTE - NSICDXPASTSURGICALHX_GEN_ALL_CORE_FT
Called, left vm for pt to return call to office.  Message left for patient to call due to he has orders but pharmacy states it is on back order. Also left message for patient to call pharmacy or insurance to see if they can find that medication at another drug store.    PAST SURGICAL HISTORY:  No significant past surgical history
